# Patient Record
Sex: FEMALE | Race: WHITE | Employment: UNEMPLOYED | URBAN - NONMETROPOLITAN AREA
[De-identification: names, ages, dates, MRNs, and addresses within clinical notes are randomized per-mention and may not be internally consistent; named-entity substitution may affect disease eponyms.]

---

## 2020-04-27 ENCOUNTER — HOSPITAL ENCOUNTER (OUTPATIENT)
Dept: LAB | Age: 35
Discharge: HOME | End: 2020-04-27

## 2020-04-27 ENCOUNTER — HOSPITAL ENCOUNTER (OUTPATIENT)
Age: 35
Setting detail: SPECIMEN
Discharge: HOME | End: 2020-04-27

## 2020-04-27 ENCOUNTER — OFFICE VISIT (OUTPATIENT)
Dept: PRIMARY CARE CLINIC | Age: 35
End: 2020-04-27
Payer: MEDICAID

## 2020-04-27 VITALS
HEIGHT: 63 IN | TEMPERATURE: 100 F | DIASTOLIC BLOOD PRESSURE: 78 MMHG | HEART RATE: 83 BPM | BODY MASS INDEX: 30.76 KG/M2 | OXYGEN SATURATION: 97 % | SYSTOLIC BLOOD PRESSURE: 120 MMHG | WEIGHT: 173.6 LBS

## 2020-04-27 LAB
DIRECT EXAM: ABNORMAL
HAV IGM SER IA-ACNC: NONREACTIVE
HEPATITIS B CORE IGM ANTIBODY: NONREACTIVE
HEPATITIS B SURFACE ANTIGEN: NONREACTIVE
HEPATITIS C ANTIBODY: NONREACTIVE
HIV AG/AB: NONREACTIVE
Lab: ABNORMAL
SPECIMEN DESCRIPTION: ABNORMAL
T. PALLIDUM, IGG: NONREACTIVE

## 2020-04-27 PROCEDURE — 87591 N.GONORRHOEAE DNA AMP PROB: CPT

## 2020-04-27 PROCEDURE — 99212 OFFICE O/P EST SF 10 MIN: CPT | Performed by: FAMILY MEDICINE

## 2020-04-27 PROCEDURE — 87389 HIV-1 AG W/HIV-1&-2 AB AG IA: CPT

## 2020-04-27 PROCEDURE — 80074 ACUTE HEPATITIS PANEL: CPT

## 2020-04-27 PROCEDURE — 87510 GARDNER VAG DNA DIR PROBE: CPT

## 2020-04-27 PROCEDURE — 87491 CHLMYD TRACH DNA AMP PROBE: CPT

## 2020-04-27 PROCEDURE — 86780 TREPONEMA PALLIDUM: CPT

## 2020-04-27 PROCEDURE — 87660 TRICHOMONAS VAGIN DIR PROBE: CPT

## 2020-04-27 PROCEDURE — 87480 CANDIDA DNA DIR PROBE: CPT

## 2020-04-27 PROCEDURE — 36415 COLL VENOUS BLD VENIPUNCTURE: CPT

## 2020-04-27 PROCEDURE — 99214 OFFICE O/P EST MOD 30 MIN: CPT | Performed by: FAMILY MEDICINE

## 2020-04-27 ASSESSMENT — ENCOUNTER SYMPTOMS
DIARRHEA: 0
SINUS PRESSURE: 1
WHEEZING: 0
RHINORRHEA: 1
CONSTIPATION: 0
COUGH: 1
VOMITING: 1
NAUSEA: 1
SORE THROAT: 0
SHORTNESS OF BREATH: 0

## 2020-04-27 NOTE — LETTER
[address] [facility]  [address] [facility] [address]  Phone: [phone]  Fax: [phone],         April 27, 2020     Patient: Judy Catherine   YOB: 1985   Date of Visit: 4/27/2020       To Whom it May Concern:    Judy Catherine was seen in my clinic on 4/27/2020. Please excuse her from work today due to illness. She can return to work when she has remained fever-free without fever-reducing medication for 72 hours in a row. If you have any questions or concerns, please don't hesitate to call.     Sincerely,         Timi Soto, DO

## 2020-04-27 NOTE — PROGRESS NOTES
[address]  [address], [address]lana Moe  [phone]      Suzie Montano is a 38 y.o. female who is c/o of Fever (needs a work note- called off yesterday) and Exposure to STD (new partner, having discharge and oder)      HPI:     Fever    This is a new problem. The current episode started yesterday. The maximum temperature noted was 101 to 101.9 F (101.5 (oral) this am). Associated symptoms include coughing (had one yesterday, but not today), headaches (\"sinus headache\"), nausea (none now) and vomiting (last episode this am - started overnight). Pertinent negatives include no congestion, diarrhea, ear pain, sore throat, urinary pain or wheezing. Treatments tried: Tylenol (last dose 2.5 hours ago), Advil (last dose 2.5 hours ago)   Exposure to STD    The patient's primary symptoms include a discharge. The patient's pertinent negatives include no dysuria. Primary symptoms comment: no known STD exposure, but concerned as she has a new partner; he is not having any current symptoms. This is a new problem. The current episode started 1 to 4 weeks ago (3 weeks ago). The vaginal discharge was thick and milky. Associate symptoms include a fever, a genital odor and urinary frequency. Pertinent negatives include no sore throat. She has tried nothing for the symptoms. No known sick contacts. Subjective:      History reviewed. No pertinent past medical history. History reviewed. No pertinent surgical history. Social History     Tobacco Use    Smoking status: Current Every Day Smoker    Smokeless tobacco: Never Used   Substance Use Topics    Alcohol use: Yes     Alcohol/week: 0.0 standard drinks     Comment: occas      Current Outpatient Medications   Medication Sig Dispense Refill    levothyroxine (SYNTHROID) 175 MCG tablet 150 mcg   1    metroNIDAZOLE (FLAGYL) 500 MG tablet Take 1 tablet by mouth 2 times daily for 7 days Take with food.  [facility] tablet 0     No current facility-administered medications for this visit. No Known Allergies    Review of Systems   Constitutional: Positive for chills, fatigue and fever. HENT: Positive for rhinorrhea (yellow mucus when she would blow her nose) and sinus pressure (frontal). Negative for congestion, ear pain and sore throat. Respiratory: Positive for cough (had one yesterday, but not today). Negative for shortness of breath and wheezing. Gastrointestinal: Positive for nausea (none now) and vomiting (last episode this am - started overnight). Negative for constipation and diarrhea. Genitourinary: Positive for frequency, hematuria (very small spot of blood intermittently when she wipes x past month) and vaginal discharge (creamy, odorous x past 3 weeks - odor is worsening). Negative for dysuria and genital sores. Neurological: Positive for headaches (\"sinus headache\"). Objective:     Vitals:    04/27/20 1322   BP: 120/78   Site: Right Upper Arm   Position: Sitting   Cuff Size: Medium Adult   Pulse: 83   Temp: 100 °F (37.8 °C)   TempSrc: Tympanic   SpO2: 97%   Weight: 173 lb 9.6 oz (78.7 kg)   Height: 5' 3\" (1.6 m)     Physical Exam  Vitals signs and nursing note reviewed. Constitutional:       General: She is not in acute distress. Appearance: She is well-developed. HENT:      Head: Normocephalic and atraumatic. Right Ear: Tympanic membrane, ear canal and external ear normal.      Left Ear: Tympanic membrane, ear canal and external ear normal.      Nose: Mucosal edema present. Right Sinus: Maxillary sinus tenderness and frontal sinus tenderness present. Left Sinus: Maxillary sinus tenderness and frontal sinus tenderness present. Mouth/Throat:      Pharynx: Posterior oropharyngeal erythema (Mild) present. No oropharyngeal exudate. Eyes:      Conjunctiva/sclera: Conjunctivae normal.      Pupils: Pupils are equal, round, and reactive to light.    Neck:

## 2020-04-28 ENCOUNTER — TELEPHONE (OUTPATIENT)
Dept: PRIMARY CARE CLINIC | Age: 35
End: 2020-04-28

## 2020-04-28 LAB
C TRACH DNA GENITAL QL NAA+PROBE: NEGATIVE
N. GONORRHOEAE DNA: NEGATIVE
SPECIMEN DESCRIPTION: NORMAL

## 2020-04-28 RX ORDER — METRONIDAZOLE 500 MG/1
500 TABLET ORAL 2 TIMES DAILY
Qty: 14 TABLET | Refills: 0 | Status: SHIPPED | OUTPATIENT
Start: 2020-04-28 | End: 2020-05-05

## 2020-04-28 NOTE — TELEPHONE ENCOUNTER
PT CALLED IN AND WAS GIVEN THE RESULTS OF LAB RESULTS AND INFORMED OF THE MEDICATION SENT TO THE PHARMACY. PT VOICED UNDERSTANDING.

## 2021-11-06 ENCOUNTER — HOSPITAL ENCOUNTER (OUTPATIENT)
Age: 36
Setting detail: SPECIMEN
Discharge: HOME | End: 2021-11-06
Payer: COMMERCIAL

## 2021-11-06 ENCOUNTER — OFFICE VISIT (OUTPATIENT)
Dept: PRIMARY CARE CLINIC | Age: 36
End: 2021-11-06
Payer: COMMERCIAL

## 2021-11-06 VITALS
TEMPERATURE: 98.4 F | HEART RATE: 99 BPM | OXYGEN SATURATION: 97 % | BODY MASS INDEX: 34.05 KG/M2 | HEIGHT: 63 IN | SYSTOLIC BLOOD PRESSURE: 110 MMHG | DIASTOLIC BLOOD PRESSURE: 80 MMHG | WEIGHT: 192.2 LBS

## 2021-11-06 DIAGNOSIS — J02.9 PHARYNGITIS, UNSPECIFIED ETIOLOGY: ICD-10-CM

## 2021-11-06 DIAGNOSIS — J02.9 PHARYNGITIS, UNSPECIFIED ETIOLOGY: Primary | ICD-10-CM

## 2021-11-06 LAB — S PYO AG THROAT QL: NORMAL

## 2021-11-06 PROCEDURE — U0003 INFECTIOUS AGENT DETECTION BY NUCLEIC ACID (DNA OR RNA); SEVERE ACUTE RESPIRATORY SYNDROME CORONAVIRUS 2 (SARS-COV-2) (CORONAVIRUS DISEASE [COVID-19]), AMPLIFIED PROBE TECHNIQUE, MAKING USE OF HIGH THROUGHPUT TECHNOLOGIES AS DESCRIBED BY CMS-2020-01-R: HCPCS

## 2021-11-06 PROCEDURE — 99212 OFFICE O/P EST SF 10 MIN: CPT

## 2021-11-06 PROCEDURE — 4004F PT TOBACCO SCREEN RCVD TLK: CPT | Performed by: FAMILY MEDICINE

## 2021-11-06 PROCEDURE — PBSHW POCT RAPID STREP A: Performed by: FAMILY MEDICINE

## 2021-11-06 PROCEDURE — G8484 FLU IMMUNIZE NO ADMIN: HCPCS | Performed by: FAMILY MEDICINE

## 2021-11-06 PROCEDURE — G8417 CALC BMI ABV UP PARAM F/U: HCPCS | Performed by: FAMILY MEDICINE

## 2021-11-06 PROCEDURE — 99212 OFFICE O/P EST SF 10 MIN: CPT | Performed by: FAMILY MEDICINE

## 2021-11-06 PROCEDURE — 87880 STREP A ASSAY W/OPTIC: CPT | Performed by: FAMILY MEDICINE

## 2021-11-06 PROCEDURE — U0005 INFEC AGEN DETEC AMPLI PROBE: HCPCS

## 2021-11-06 PROCEDURE — G8427 DOCREV CUR MEDS BY ELIG CLIN: HCPCS | Performed by: FAMILY MEDICINE

## 2021-11-06 PROCEDURE — 99214 OFFICE O/P EST MOD 30 MIN: CPT | Performed by: FAMILY MEDICINE

## 2021-11-06 RX ORDER — PREDNISONE 20 MG/1
TABLET ORAL
Qty: 15 TABLET | Refills: 0 | Status: SHIPPED | OUTPATIENT
Start: 2021-11-06

## 2021-11-06 ASSESSMENT — ENCOUNTER SYMPTOMS
VOMITING: 1
SORE THROAT: 1
COUGH: 1
NAUSEA: 1

## 2021-11-06 ASSESSMENT — PATIENT HEALTH QUESTIONNAIRE - PHQ9
SUM OF ALL RESPONSES TO PHQ9 QUESTIONS 1 & 2: 0
SUM OF ALL RESPONSES TO PHQ QUESTIONS 1-9: 0
2. FEELING DOWN, DEPRESSED OR HOPELESS: 0
SUM OF ALL RESPONSES TO PHQ QUESTIONS 1-9: 0
1. LITTLE INTEREST OR PLEASURE IN DOING THINGS: 0
SUM OF ALL RESPONSES TO PHQ QUESTIONS 1-9: 0

## 2021-11-06 NOTE — PROGRESS NOTES
2021     Radha Sy (:  1985) is a 43 y.o. female, here for evaluation of the following medical concerns:    Pharyngitis  This is a new problem. The current episode started 1 to 4 weeks ago (3 weeks ago has started having symptoms). Associated symptoms include congestion, coughing, fatigue, myalgias, nausea, a sore throat (woke up today and had abrupt onset of sore throat) and vomiting (dry heaving). Pertinent negatives include no fever or joint swelling. Associated symptoms comments: No taste or smell. Has had some wheezing. Hoarseness to voice. She has tried NSAIDs for the symptoms. The treatment provided moderate relief. Did review patient's med list, allergies, social history,pmhx and pshx today as noted in the record. Review of Systems   Constitutional: Positive for fatigue. Negative for fever. HENT: Positive for congestion and sore throat (woke up today and had abrupt onset of sore throat). Respiratory: Positive for cough. Gastrointestinal: Positive for nausea and vomiting (dry heaving). Musculoskeletal: Positive for myalgias. Negative for joint swelling. Prior to Visit Medications    Medication Sig Taking? Authorizing Provider   levothyroxine (SYNTHROID) 175 MCG tablet 150 mcg  Yes Historical Provider, MD        Social History     Tobacco Use    Smoking status: Current Every Day Smoker    Smokeless tobacco: Never Used   Substance Use Topics    Alcohol use: Yes     Alcohol/week: 0.0 standard drinks     Comment: occas        Vitals:    21 1643   BP: 110/80   Site: Left Upper Arm   Position: Sitting   Cuff Size: Medium Adult   Pulse: 99   Temp: 98.4 °F (36.9 °C)   TempSrc: Tympanic   SpO2: 97%   Weight: 192 lb 3.2 oz (87.2 kg)   Height: 5' 3\" (1.6 m)     Estimated body mass index is 34.05 kg/m² as calculated from the following:    Height as of this encounter: 5' 3\" (1.6 m). Weight as of this encounter: 192 lb 3.2 oz (87.2 kg).     Physical Exam  Vitals tests will be prioritized based on responses to questions above. Testing may be delayed due to volume. 2) Print and instruct patient to adhere to CDC home isolation program. (Link Above)              3) Set up or refer patient for a monitoring program.              4) Have patient sign up for and leverage MyChart (if not previously done). Order Specific Question:   Is this test for diagnosis or screening? Answer:   Diagnosis of ill patient     Order Specific Question:   Symptomatic for COVID-19 as defined by CDC? Answer:   Yes     Order Specific Question:   Date of Symptom Onset     Answer:   11/6/2021     Order Specific Question:   Hospitalized for COVID-19? Answer:   No     Order Specific Question:   Admitted to ICU for COVID-19? Answer:   No     Order Specific Question:   Employed in healthcare setting? Answer:   No     Order Specific Question:   Resident in a congregate (group) care setting? Answer:   No     Order Specific Question:   Pregnant? Answer:   No     Order Specific Question:   Previously tested for COVID-19? Answer: Yes    POCT rapid strep A     Rapid strep is negative. Will give prednisone to help with wheezing and inflammation. Can use her albuterol inhaler as needed. Will order covid testing. In interim patient is to quarantine until testing reports are available    Increase fluids and rest    Tylenol 1-2 tabs po q4h prn    Can use mucinex or mucinex DM or comparable for congestion or cough. Return  if no improvement in symptoms or if any further symptoms arise. No follow-ups on file. An electronic signature was used to authenticate this note.     --Adonay Mercer, DO on 11/6/2021 at 4:49 PM

## 2021-11-06 NOTE — PATIENT INSTRUCTIONS
teaspoon).     · You have signs of low blood pressure. These include feeling lightheaded; being too weak to stand; and having cold, pale, clammy skin. Watch closely for changes in your health, and be sure to contact your doctor if:    · Your symptoms get worse.     · You are not getting better as expected.     · You have new or worse symptoms of anxiety, depression, nightmares, or flashbacks. Call before you go to the doctor's office. Follow their instructions. And wear a mask. Current as of: July 1, 2021               Content Version: 13.0  © 2006-2021 [employer]. Care instructions adapted under license by [facility] ([facility]). If you have questions about a medical condition or this instruction, always ask your healthcare professional. [address] any warranty or liability for your use of this information.

## 2021-11-06 NOTE — Clinical Note
[address] [facility] A department of [place] [address]  Phone: [phone]  Fax: [address] [place], DO        November 6, 2021     Patient: Serenity Artis   YOB: 1985   Date of Visit: 11/6/2021       To Whom It May Concern: It is my medical opinion that Serenity Artis {Work release (duty restriction):49038}. If you have any questions or concerns, please don't hesitate to call.     Sincerely,        Estelita Kitchen, DO

## 2021-11-08 LAB
SARS-COV-2: ABNORMAL
SARS-COV-2: ABNORMAL
SOURCE: ABNORMAL

## 2021-11-16 ENCOUNTER — TELEPHONE (OUTPATIENT)
Dept: PRIMARY CARE CLINIC | Age: 36
End: 2021-11-16

## 2021-11-16 NOTE — TELEPHONE ENCOUNTER
Patient would like a refill on her albuterol inhaler. Dr. Linden Augustine  Was told at the time of the visit, that her inhaler had enough. Now inhaler empty and would like a refill. Please contact patient regarding the inhaler.  [facility]  Is her pharmacy

## 2022-03-04 ENCOUNTER — HOSPITAL ENCOUNTER (EMERGENCY)
Age: 37
Discharge: HOME | End: 2022-03-04
Attending: EMERGENCY MEDICINE
Payer: COMMERCIAL

## 2022-03-04 ENCOUNTER — APPOINTMENT (OUTPATIENT)
Dept: CT IMAGING | Age: 37
End: 2022-03-04
Payer: COMMERCIAL

## 2022-03-04 VITALS
BODY MASS INDEX: 28.35 KG/M2 | OXYGEN SATURATION: 96 % | SYSTOLIC BLOOD PRESSURE: 100 MMHG | RESPIRATION RATE: 16 BRPM | HEIGHT: 63 IN | DIASTOLIC BLOOD PRESSURE: 61 MMHG | WEIGHT: 160 LBS | HEART RATE: 55 BPM | TEMPERATURE: 97.6 F

## 2022-03-04 DIAGNOSIS — R10.10 PAIN OF UPPER ABDOMEN: Primary | ICD-10-CM

## 2022-03-04 LAB
-: ABNORMAL
ABSOLUTE EOS #: 0.2 K/UL (ref 0–0.44)
ABSOLUTE IMMATURE GRANULOCYTE: 0.17 K/UL (ref 0–0.3)
ABSOLUTE LYMPH #: 2.88 K/UL (ref 1.1–3.7)
ABSOLUTE MONO #: 0.73 K/UL (ref 0.1–1.2)
ALBUMIN SERPL-MCNC: 4.2 G/DL (ref 3.5–5.2)
ALBUMIN/GLOBULIN RATIO: 1.8 (ref 1–2.5)
ALP BLD-CCNC: 74 U/L (ref 35–104)
ALT SERPL-CCNC: 32 U/L (ref 5–33)
ANION GAP SERPL CALCULATED.3IONS-SCNC: 9 MMOL/L (ref 9–17)
AST SERPL-CCNC: 37 U/L
BACTERIA: ABNORMAL
BASOPHILS # BLD: 1 % (ref 0–2)
BASOPHILS ABSOLUTE: 0.06 K/UL (ref 0–0.2)
BILIRUB SERPL-MCNC: <0.1 MG/DL (ref 0.3–1.2)
BILIRUBIN URINE: NEGATIVE
BUN BLDV-MCNC: 14 MG/DL (ref 6–20)
BUN/CREAT BLD: 16 (ref 9–20)
CALCIUM SERPL-MCNC: 9.2 MG/DL (ref 8.6–10.4)
CHLORIDE BLD-SCNC: 105 MMOL/L (ref 98–107)
CO2: 22 MMOL/L (ref 20–31)
CREAT SERPL-MCNC: 0.87 MG/DL (ref 0.5–0.9)
EOSINOPHILS RELATIVE PERCENT: 2 % (ref 1–4)
EPITHELIAL CELLS UA: >50 /HPF (ref 0–5)
GFR AFRICAN AMERICAN: >60 ML/MIN
GFR NON-AFRICAN AMERICAN: >60 ML/MIN
GFR SERPL CREATININE-BSD FRML MDRD: ABNORMAL ML/MIN/{1.73_M2}
GLUCOSE BLD-MCNC: 128 MG/DL (ref 70–99)
GLUCOSE URINE: NEGATIVE
HCG QUALITATIVE: NEGATIVE
HCT VFR BLD CALC: 41.9 % (ref 36.3–47.1)
HEMOGLOBIN: 14.4 G/DL (ref 11.9–15.1)
IMMATURE GRANULOCYTES: 1 %
KETONES, URINE: NEGATIVE
LEUKOCYTE ESTERASE, URINE: NEGATIVE
LIPASE: 39 U/L (ref 13–60)
LYMPHOCYTES # BLD: 24 % (ref 24–43)
MCH RBC QN AUTO: 32.3 PG (ref 25.2–33.5)
MCHC RBC AUTO-ENTMCNC: 34.4 G/DL (ref 25.2–33.5)
MCV RBC AUTO: 93.9 FL (ref 82.6–102.9)
MONOCYTES # BLD: 6 % (ref 3–12)
MUCUS: ABNORMAL
NITRITE, URINE: NEGATIVE
NRBC AUTOMATED: 0 PER 100 WBC
PDW BLD-RTO: 13.7 % (ref 11.8–14.4)
PH UA: 6 (ref 5–6)
PLATELET # BLD: 404 K/UL (ref 138–453)
PMV BLD AUTO: 9.7 FL (ref 8.1–13.5)
POTASSIUM SERPL-SCNC: 4.2 MMOL/L (ref 3.7–5.3)
PROTEIN UA: NEGATIVE
RBC # BLD: 4.46 M/UL (ref 3.95–5.11)
RBC UA: ABNORMAL /HPF (ref 0–4)
SEG NEUTROPHILS: 66 % (ref 36–65)
SEGMENTED NEUTROPHILS ABSOLUTE COUNT: 7.98 K/UL (ref 1.5–8.1)
SODIUM BLD-SCNC: 136 MMOL/L (ref 135–144)
SPECIFIC GRAVITY UA: 1.03 (ref 1.01–1.02)
TOTAL PROTEIN: 6.5 G/DL (ref 6.4–8.3)
URINE HGB: NEGATIVE
UROBILINOGEN, URINE: NORMAL
WBC # BLD: 12 K/UL (ref 3.5–11.3)
WBC UA: ABNORMAL /HPF (ref 0–4)

## 2022-03-04 PROCEDURE — 83690 ASSAY OF LIPASE: CPT

## 2022-03-04 PROCEDURE — 96374 THER/PROPH/DIAG INJ IV PUSH: CPT

## 2022-03-04 PROCEDURE — 96375 TX/PRO/DX INJ NEW DRUG ADDON: CPT

## 2022-03-04 PROCEDURE — 84703 CHORIONIC GONADOTROPIN ASSAY: CPT

## 2022-03-04 PROCEDURE — 99285 EMERGENCY DEPT VISIT HI MDM: CPT

## 2022-03-04 PROCEDURE — 6360000002 HC RX W HCPCS: Performed by: EMERGENCY MEDICINE

## 2022-03-04 PROCEDURE — 81001 URINALYSIS AUTO W/SCOPE: CPT

## 2022-03-04 PROCEDURE — 80053 COMPREHEN METABOLIC PANEL: CPT

## 2022-03-04 PROCEDURE — 74176 CT ABD & PELVIS W/O CONTRAST: CPT

## 2022-03-04 PROCEDURE — 85025 COMPLETE CBC W/AUTO DIFF WBC: CPT

## 2022-03-04 RX ORDER — ONDANSETRON 2 MG/ML
4 INJECTION INTRAMUSCULAR; INTRAVENOUS ONCE
Status: COMPLETED | OUTPATIENT
Start: 2022-03-04 | End: 2022-03-04

## 2022-03-04 RX ORDER — MORPHINE SULFATE 4 MG/ML
4 INJECTION, SOLUTION INTRAMUSCULAR; INTRAVENOUS ONCE
Status: COMPLETED | OUTPATIENT
Start: 2022-03-04 | End: 2022-03-04

## 2022-03-04 RX ADMIN — ONDANSETRON 4 MG: 2 INJECTION INTRAMUSCULAR; INTRAVENOUS at 01:19

## 2022-03-04 RX ADMIN — MORPHINE SULFATE 4 MG: 4 INJECTION, SOLUTION INTRAMUSCULAR; INTRAVENOUS at 01:18

## 2022-03-04 ASSESSMENT — PAIN DESCRIPTION - LOCATION: LOCATION: ABDOMEN

## 2022-03-04 ASSESSMENT — PAIN SCALES - GENERAL
PAINLEVEL_OUTOF10: 0
PAINLEVEL_OUTOF10: 0
PAINLEVEL_OUTOF10: 6
PAINLEVEL_OUTOF10: 0
PAINLEVEL_OUTOF10: 6

## 2022-03-04 ASSESSMENT — PAIN DESCRIPTION - PAIN TYPE: TYPE: ACUTE PAIN

## 2022-03-04 ASSESSMENT — PAIN DESCRIPTION - FREQUENCY: FREQUENCY: CONTINUOUS

## 2022-03-04 ASSESSMENT — PAIN - FUNCTIONAL ASSESSMENT: PAIN_FUNCTIONAL_ASSESSMENT: 0-10

## 2022-03-04 ASSESSMENT — PAIN DESCRIPTION - DESCRIPTORS: DESCRIPTORS: SHARP

## 2022-03-04 NOTE — ED PROVIDER NOTES
eMERGENCY dEPARTMENT eNCOUnter      Pt Name: Miguelina Mullins  MRN: [id number]  [place] 1985  Date of evaluation: 3/4/2022      CHIEF COMPLAINT       Chief Complaint   Patient presents with    Abdominal Pain         HISTORY OF PRESENT ILLNESS    Miguelina Mullins is a 42 y.o. female who presents abdominal pain. Patient states approximate 1 hour ago she woke up with complaints of abdominal pain, upper quadrant occasionally radiated to her back she has had a couple of prior episodes of similar events recently but none have lasted very long she denies associated chest pain or shortness of breath complains of some nausea but no vomiting no other exacerbating or relieving factors        REVIEW OF SYSTEMS       Review of systems are all reviewed and negative except stated above in HPI    PAST MEDICAL HISTORY    has no past medical history on file. SURGICAL HISTORY      has no past surgical history on file. CURRENT MEDICATIONS       Discharge Medication List as of 3/4/2022  3:01 AM      CONTINUE these medications which have NOT CHANGED    Details   predniSONE (DELTASONE) 20 MG tablet 1 bid for 5 days, 1 qd for 5 days, Disp-15 tablet, R-0Normal      levothyroxine (SYNTHROID) 175 MCG tablet 150 mcg , R-1             ALLERGIES     has No Known Allergies. FAMILY HISTORY     has no family status information on file. family history is not on file. SOCIAL HISTORY      reports that she has been smoking. She has never used smokeless tobacco. She reports current alcohol use. She reports that she does not use drugs. PHYSICAL EXAM     INITIAL VITALS:  height is 5' 3\" (1.6 m) and weight is 160 lb (72.6 kg). Her tympanic temperature is 97.6 °F (36.4 °C). Her blood pressure is 100/61 and her pulse is 55. Her respiration is 16 and oxygen saturation is 96%.       General: Patient is alert nontoxic-appearing female who appears uncomfortable secondary to pain  HEENT: Head is atraumatic mouth shows moist mucous TempSrc:       SpO2: 97% 97% 96% 96%   Weight:       Height:         -------------------------  BP: 100/61, Temp: 97.6 °F (36.4 °C), Pulse: 55, Resp: 16    Orders Placed This Encounter   Medications    ondansetron (ZOFRAN) injection 4 mg    morphine injection 4 mg           Re-evaluation Notes    Patient has slight elevation of white blood cell count otherwise rest her labs are unremarkable she was given morphine and Zofran here with complete relief of her discomfort CT unremarkable at this time I feel she is stable for discharge home with outpatient follow-up and possible evaluation for gallbladder disease return if worse no indication of an atypical cardiac presentation    CRITICAL CARE:   None      CONSULTS:      PROCEDURES:  None    FINAL IMPRESSION      1. Pain of upper abdomen          DISPOSITION/PLAN   DISPOSITION Decision To Discharge 03/04/2022 03:00:20 AM      Condition on Disposition    Stable and improved    PATIENT REFERRED TO:  [address]  [address] [place] [id number] [phone]    In 1 day        DISCHARGE MEDICATIONS:  Discharge Medication List as of 3/4/2022  3:01 AM          (Please note that portions of this note were completed with a voice recognition program.  Efforts were made to edit the dictations but occasionally words are mis-transcribed.)    Elissa Boudreaux MD,, MD, F.A.C.E.P.   Attending Emergency Physician        Elissa Boudreaux MD  03/04/22 [id number]

## 2022-10-11 ENCOUNTER — HOSPITAL ENCOUNTER (OUTPATIENT)
Age: 37
Setting detail: SPECIMEN
Discharge: HOME | End: 2022-10-11
Payer: MEDICAID

## 2022-10-11 ENCOUNTER — OFFICE VISIT (OUTPATIENT)
Dept: PRIMARY CARE CLINIC | Age: 37
End: 2022-10-11
Payer: MEDICAID

## 2022-10-11 VITALS
HEIGHT: 64 IN | DIASTOLIC BLOOD PRESSURE: 80 MMHG | TEMPERATURE: 98.4 F | BODY MASS INDEX: 29.02 KG/M2 | SYSTOLIC BLOOD PRESSURE: 100 MMHG | WEIGHT: 170 LBS | OXYGEN SATURATION: 99 % | HEART RATE: 84 BPM

## 2022-10-11 DIAGNOSIS — R52 BODY ACHES: ICD-10-CM

## 2022-10-11 DIAGNOSIS — J06.9 UPPER RESPIRATORY TRACT INFECTION, UNSPECIFIED TYPE: Primary | ICD-10-CM

## 2022-10-11 LAB
INFLUENZA A ANTIBODY: NORMAL
INFLUENZA B ANTIBODY: NORMAL

## 2022-10-11 PROCEDURE — 99213 OFFICE O/P EST LOW 20 MIN: CPT | Performed by: NURSE PRACTITIONER

## 2022-10-11 PROCEDURE — G8484 FLU IMMUNIZE NO ADMIN: HCPCS | Performed by: NURSE PRACTITIONER

## 2022-10-11 PROCEDURE — G8417 CALC BMI ABV UP PARAM F/U: HCPCS | Performed by: NURSE PRACTITIONER

## 2022-10-11 PROCEDURE — 4004F PT TOBACCO SCREEN RCVD TLK: CPT | Performed by: NURSE PRACTITIONER

## 2022-10-11 PROCEDURE — U0003 INFECTIOUS AGENT DETECTION BY NUCLEIC ACID (DNA OR RNA); SEVERE ACUTE RESPIRATORY SYNDROME CORONAVIRUS 2 (SARS-COV-2) (CORONAVIRUS DISEASE [COVID-19]), AMPLIFIED PROBE TECHNIQUE, MAKING USE OF HIGH THROUGHPUT TECHNOLOGIES AS DESCRIBED BY CMS-2020-01-R: HCPCS

## 2022-10-11 PROCEDURE — PBSHW POCT INFLUENZA A/B: Performed by: NURSE PRACTITIONER

## 2022-10-11 PROCEDURE — G8427 DOCREV CUR MEDS BY ELIG CLIN: HCPCS | Performed by: NURSE PRACTITIONER

## 2022-10-11 PROCEDURE — 99212 OFFICE O/P EST SF 10 MIN: CPT | Performed by: NURSE PRACTITIONER

## 2022-10-11 PROCEDURE — 87804 INFLUENZA ASSAY W/OPTIC: CPT | Performed by: NURSE PRACTITIONER

## 2022-10-11 PROCEDURE — U0005 INFEC AGEN DETEC AMPLI PROBE: HCPCS

## 2022-10-11 ASSESSMENT — ENCOUNTER SYMPTOMS
ABDOMINAL PAIN: 0
SORE THROAT: 1
RHINORRHEA: 1
COUGH: 1
SHORTNESS OF BREATH: 0
VOMITING: 1
WHEEZING: 0
CHEST TIGHTNESS: 0

## 2022-10-11 ASSESSMENT — PATIENT HEALTH QUESTIONNAIRE - PHQ9
SUM OF ALL RESPONSES TO PHQ QUESTIONS 1-9: 0
SUM OF ALL RESPONSES TO PHQ9 QUESTIONS 1 & 2: 0
1. LITTLE INTEREST OR PLEASURE IN DOING THINGS: 0
2. FEELING DOWN, DEPRESSED OR HOPELESS: 0
SUM OF ALL RESPONSES TO PHQ QUESTIONS 1-9: 0

## 2022-10-11 NOTE — PROGRESS NOTES
[facility]             [address], [address]                        Telephone [phone]             Fax [phone]  1985  ZV:[id number]   Date of visit:  10/11/2022    Subjective:    Francisco Anderson is a 42 y.o.  female who presents to [facility] today (10/11/2022) for evaluation of:    Chief Complaint   Patient presents with    Fever     Sx began sat evening, body aches,vomiting,hasn't been eating much. Pt is drinking but pt vomits also. Pt is fatigue. Generalized Body Aches  This is a new problem. The current episode started in the past 7 days (10/08/22). The problem occurs constantly. The problem has been unchanged. Associated symptoms include congestion, coughing, fatigue, a fever (101.0; now resolved), headaches, a sore throat (now resolved) and vomiting (last episode yesterday). Pertinent negatives include no abdominal pain or chest pain. Associated symptoms comments: rhinorrhea. Nothing aggravates the symptoms. She has tried acetaminophen for the symptoms. The treatment provided mild relief. She has the following problem list:  There is no problem list on file for this patient. Current medications are:  Current Outpatient Medications   Medication Sig Dispense Refill    levothyroxine (SYNTHROID) 175 MCG tablet 150 mcg   1    predniSONE (DELTASONE) 20 MG tablet 1 bid for 5 days, 1 qd for 5 days (Patient not taking: Reported on 10/11/2022) 15 tablet 0     No current facility-administered medications for this visit. She has No Known Allergies. .    She  reports that she has been smoking. She has never used smokeless tobacco.      Objective:    Vitals:    10/11/22 1303   BP: 100/80   Pulse: 84   Temp: 98.4 °F (36.9 °C)   TempSrc: Tympanic   SpO2: 99%   Weight: 170 lb (77.1 kg)   Height: 5' 3.5\" (1.613 m)     Body mass index is 29.64 kg/m².     Review of Systems   Constitutional:  Positive for fatigue and fever (101.0; now resolved). HENT:  Positive for congestion, postnasal drip, rhinorrhea and sore throat (now resolved). Respiratory:  Positive for cough. Negative for chest tightness, shortness of breath and wheezing. Cardiovascular: Negative. Negative for chest pain. Gastrointestinal:  Positive for vomiting (last episode yesterday). Negative for abdominal pain. Neurological:  Positive for headaches. Physical Exam  Vitals and nursing note reviewed. Constitutional:       Appearance: Normal appearance. She is well-developed. HENT:      Head: Normocephalic. Jaw: There is normal jaw occlusion. Right Ear: Tympanic membrane, ear canal and external ear normal.      Left Ear: Tympanic membrane, ear canal and external ear normal.      Nose: Congestion and rhinorrhea present. Rhinorrhea is clear. Right Turbinates: Swollen (erythema). Left Turbinates: Swollen (erythema). Right Sinus: No maxillary sinus tenderness or frontal sinus tenderness. Left Sinus: No maxillary sinus tenderness or frontal sinus tenderness. Mouth/Throat:      Lips: Pink. Mouth: Mucous membranes are moist.      Pharynx: Oropharynx is clear. Uvula midline. Eyes:      Pupils: Pupils are equal, round, and reactive to light. Cardiovascular:      Rate and Rhythm: Normal rate and regular rhythm. Heart sounds: Normal heart sounds. Pulmonary:      Effort: Pulmonary effort is normal.      Breath sounds: Normal breath sounds and air entry. Abdominal:      General: Abdomen is flat. Bowel sounds are increased. Palpations: Abdomen is soft. Tenderness: There is no abdominal tenderness. Musculoskeletal:      Cervical back: Normal range of motion and neck supple. Skin:     General: Skin is warm and dry. Neurological:      General: No focal deficit present. Mental Status: She is alert and oriented to person, place, and time. Psychiatric:         Behavior: Behavior normal.         Thought Content: Thought content normal.       Assessment and Plan:    Results for POC orders placed in visit on 10/11/22   POCT Influenza A/B   Result Value Ref Range    Influenza A Ab neg     Influenza B Ab neg         Diagnosis Orders   1. Upper respiratory tract infection, unspecified type        2. Body aches  COVID-19    POCT Influenza A/B        Self quarantine until negative Covid-19 test result received and symptoms improving. We will call with Covid-19 test results. I recommended that she use mucinex to help with congestion and cough. I also recommended Flonase for sinus symptoms. she was also encouraged to use tylenol or ibuprofen for pain/fever. Increase water intake. Use cool mist humidifier at bedtime. Use nasal saline flush as needed. Good hand hygiene. she was instructed to return if there is no improvement or symptoms worsen. The use, risks, benefits, and side effects of prescribed or recommended medications were discussed. All questions were answered and the patient/caregiver voiced understanding. No orders of the defined types were placed in this encounter.         Electronically signed by MILDRED Kothari CNP on 10/11/22 at 1:20 PM EDT

## 2022-10-11 NOTE — LETTER
[address] [facility] department of [facility]  Phone: [phone]  Fax: [phone]        MILDRED Benjamin CNP      October 11, 2022    Patient:   Anjel Soares  Date of Birth   1985  Date of visit   10/11/2022        To Whom it May Concern:      Anjel Soares was seen in my clinic on 10/11/2022. Please excuse from work 10/10/22 thru 10/13/22. May return to work on 10/17/22. If you have any questions or concerns, please don't hesitate to call.       Sincerely,      MILDRED Benjamin CNP

## 2022-10-12 LAB
SARS-COV-2: NORMAL
SARS-COV-2: NOT DETECTED
SOURCE: NORMAL

## 2023-01-27 ENCOUNTER — APPOINTMENT (OUTPATIENT)
Dept: CT IMAGING | Age: 38
End: 2023-01-27
Payer: MEDICAID

## 2023-01-27 ENCOUNTER — HOSPITAL ENCOUNTER (EMERGENCY)
Age: 38
Discharge: HOME | End: 2023-01-27
Attending: EMERGENCY MEDICINE
Payer: MEDICAID

## 2023-01-27 VITALS
RESPIRATION RATE: 16 BRPM | HEART RATE: 60 BPM | OXYGEN SATURATION: 97 % | BODY MASS INDEX: 29.23 KG/M2 | WEIGHT: 165 LBS | HEIGHT: 63 IN | TEMPERATURE: 98 F | SYSTOLIC BLOOD PRESSURE: 100 MMHG | DIASTOLIC BLOOD PRESSURE: 58 MMHG

## 2023-01-27 DIAGNOSIS — R10.9 ABDOMINAL PAIN, UNSPECIFIED ABDOMINAL LOCATION: Primary | ICD-10-CM

## 2023-01-27 DIAGNOSIS — K80.50 BILIARY COLIC: ICD-10-CM

## 2023-01-27 LAB
ABSOLUTE EOS #: 0.1 K/UL (ref 0–0.44)
ABSOLUTE IMMATURE GRANULOCYTE: 0.04 K/UL (ref 0–0.3)
ABSOLUTE LYMPH #: 1.78 K/UL (ref 1.1–3.7)
ABSOLUTE MONO #: 0.69 K/UL (ref 0.1–1.2)
ALBUMIN SERPL-MCNC: 4 G/DL (ref 3.5–5.2)
ALBUMIN/GLOBULIN RATIO: 1.7 (ref 1–2.5)
ALP BLD-CCNC: 64 U/L (ref 35–104)
ALT SERPL-CCNC: 26 U/L (ref 5–33)
AMYLASE: 76 U/L (ref 28–100)
ANION GAP SERPL CALCULATED.3IONS-SCNC: 9 MMOL/L (ref 9–17)
AST SERPL-CCNC: 30 U/L
BACTERIA: NORMAL
BASOPHILS # BLD: 0 % (ref 0–2)
BASOPHILS ABSOLUTE: 0.04 K/UL (ref 0–0.2)
BILIRUB SERPL-MCNC: 0.2 MG/DL (ref 0.3–1.2)
BILIRUBIN DIRECT: 0.1 MG/DL
BILIRUBIN URINE: NEGATIVE
BILIRUBIN, INDIRECT: 0.1 MG/DL (ref 0–1)
BUN BLDV-MCNC: 11 MG/DL (ref 6–20)
BUN/CREAT BLD: 13 (ref 9–20)
CALCIUM SERPL-MCNC: 9.1 MG/DL (ref 8.6–10.4)
CHLORIDE BLD-SCNC: 109 MMOL/L (ref 98–107)
CO2: 23 MMOL/L (ref 20–31)
CREAT SERPL-MCNC: 0.86 MG/DL (ref 0.5–0.9)
EOSINOPHILS RELATIVE PERCENT: 1 % (ref 1–4)
EPITHELIAL CELLS UA: NORMAL /HPF (ref 0–5)
GFR SERPL CREATININE-BSD FRML MDRD: >60 ML/MIN/1.73M2
GLOBULIN: 2.4 G/DL (ref 1.5–3.8)
GLUCOSE BLD-MCNC: 102 MG/DL (ref 70–99)
GLUCOSE URINE: NEGATIVE
HCG QUALITATIVE: NEGATIVE
HCT VFR BLD CALC: 43.4 % (ref 36.3–47.1)
HEMOGLOBIN: 15 G/DL (ref 11.9–15.1)
IMMATURE GRANULOCYTES: 0 %
KETONES, URINE: NEGATIVE
LACTIC ACID, SEPSIS: 1.1 MMOL/L (ref 0.5–1.9)
LEUKOCYTE ESTERASE, URINE: NEGATIVE
LIPASE: 34 U/L (ref 13–60)
LYMPHOCYTES # BLD: 15 % (ref 24–43)
MCH RBC QN AUTO: 31.8 PG (ref 25.2–33.5)
MCHC RBC AUTO-ENTMCNC: 34.6 G/DL (ref 25.2–33.5)
MCV RBC AUTO: 92.1 FL (ref 82.6–102.9)
MONOCYTES # BLD: 6 % (ref 3–12)
NITRITE, URINE: NEGATIVE
NRBC AUTOMATED: 0 PER 100 WBC
PDW BLD-RTO: 13.5 % (ref 11.8–14.4)
PH UA: 6.5 (ref 5–6)
PLATELET # BLD: 356 K/UL (ref 138–453)
PMV BLD AUTO: 9.2 FL (ref 8.1–13.5)
POTASSIUM SERPL-SCNC: 4.1 MMOL/L (ref 3.7–5.3)
PROTEIN UA: NEGATIVE
RBC # BLD: 4.71 M/UL (ref 3.95–5.11)
RBC UA: NORMAL /HPF (ref 0–4)
SEG NEUTROPHILS: 78 % (ref 36–65)
SEGMENTED NEUTROPHILS ABSOLUTE COUNT: 9.04 K/UL (ref 1.5–8.1)
SODIUM BLD-SCNC: 141 MMOL/L (ref 135–144)
SPECIFIC GRAVITY UA: 1.01 (ref 1.01–1.02)
TOTAL PROTEIN: 6.4 G/DL (ref 6.4–8.3)
URINE HGB: NEGATIVE
UROBILINOGEN, URINE: NORMAL
WBC # BLD: 11.7 K/UL (ref 3.5–11.3)
WBC UA: NORMAL /HPF (ref 0–4)

## 2023-01-27 PROCEDURE — 93005 ELECTROCARDIOGRAM TRACING: CPT | Performed by: EMERGENCY MEDICINE

## 2023-01-27 PROCEDURE — 6360000002 HC RX W HCPCS: Performed by: EMERGENCY MEDICINE

## 2023-01-27 PROCEDURE — C9113 INJ PANTOPRAZOLE SODIUM, VIA: HCPCS | Performed by: EMERGENCY MEDICINE

## 2023-01-27 PROCEDURE — 2580000003 HC RX 258: Performed by: EMERGENCY MEDICINE

## 2023-01-27 PROCEDURE — 96375 TX/PRO/DX INJ NEW DRUG ADDON: CPT

## 2023-01-27 PROCEDURE — 80076 HEPATIC FUNCTION PANEL: CPT

## 2023-01-27 PROCEDURE — 74177 CT ABD & PELVIS W/CONTRAST: CPT

## 2023-01-27 PROCEDURE — 85025 COMPLETE CBC W/AUTO DIFF WBC: CPT

## 2023-01-27 PROCEDURE — 83605 ASSAY OF LACTIC ACID: CPT

## 2023-01-27 PROCEDURE — 96374 THER/PROPH/DIAG INJ IV PUSH: CPT

## 2023-01-27 PROCEDURE — 82150 ASSAY OF AMYLASE: CPT

## 2023-01-27 PROCEDURE — 84703 CHORIONIC GONADOTROPIN ASSAY: CPT

## 2023-01-27 PROCEDURE — 80048 BASIC METABOLIC PNL TOTAL CA: CPT

## 2023-01-27 PROCEDURE — 6360000004 HC RX CONTRAST MEDICATION: Performed by: EMERGENCY MEDICINE

## 2023-01-27 PROCEDURE — 99285 EMERGENCY DEPT VISIT HI MDM: CPT

## 2023-01-27 PROCEDURE — 81001 URINALYSIS AUTO W/SCOPE: CPT

## 2023-01-27 PROCEDURE — 83690 ASSAY OF LIPASE: CPT

## 2023-01-27 RX ORDER — PANTOPRAZOLE SODIUM 40 MG/10ML
40 INJECTION, POWDER, LYOPHILIZED, FOR SOLUTION INTRAVENOUS ONCE
Status: COMPLETED | OUTPATIENT
Start: 2023-01-27 | End: 2023-01-27

## 2023-01-27 RX ORDER — 0.9 % SODIUM CHLORIDE 0.9 %
1000 INTRAVENOUS SOLUTION INTRAVENOUS ONCE
Status: COMPLETED | OUTPATIENT
Start: 2023-01-27 | End: 2023-01-27

## 2023-01-27 RX ORDER — MORPHINE SULFATE 4 MG/ML
4 INJECTION, SOLUTION INTRAMUSCULAR; INTRAVENOUS ONCE
Status: COMPLETED | OUTPATIENT
Start: 2023-01-27 | End: 2023-01-27

## 2023-01-27 RX ORDER — ONDANSETRON 4 MG/1
4 TABLET, FILM COATED ORAL EVERY 8 HOURS PRN
Qty: 20 TABLET | Refills: 0 | Status: SHIPPED | OUTPATIENT
Start: 2023-01-27

## 2023-01-27 RX ORDER — ONDANSETRON 2 MG/ML
4 INJECTION INTRAMUSCULAR; INTRAVENOUS ONCE
Status: COMPLETED | OUTPATIENT
Start: 2023-01-27 | End: 2023-01-27

## 2023-01-27 RX ORDER — DICYCLOMINE HYDROCHLORIDE 10 MG/1
10 CAPSULE ORAL 4 TIMES DAILY
Qty: 30 CAPSULE | Refills: 0 | Status: SHIPPED | OUTPATIENT
Start: 2023-01-27

## 2023-01-27 RX ADMIN — MORPHINE SULFATE 4 MG: 4 INJECTION, SOLUTION INTRAMUSCULAR; INTRAVENOUS at 14:45

## 2023-01-27 RX ADMIN — SODIUM CHLORIDE 1000 ML: 9 INJECTION, SOLUTION INTRAVENOUS at 14:49

## 2023-01-27 RX ADMIN — IOPAMIDOL 100 ML: 755 INJECTION, SOLUTION INTRAVENOUS at 15:38

## 2023-01-27 RX ADMIN — PANTOPRAZOLE SODIUM 40 MG: 40 INJECTION, POWDER, FOR SOLUTION INTRAVENOUS at 14:48

## 2023-01-27 RX ADMIN — ONDANSETRON 4 MG: 2 INJECTION INTRAMUSCULAR; INTRAVENOUS at 14:47

## 2023-01-27 ASSESSMENT — PAIN SCALES - GENERAL
PAINLEVEL_OUTOF10: 10
PAINLEVEL_OUTOF10: 10

## 2023-01-27 ASSESSMENT — PAIN DESCRIPTION - LOCATION
LOCATION: ABDOMEN
LOCATION: ABDOMEN

## 2023-01-27 ASSESSMENT — PAIN DESCRIPTION - ORIENTATION: ORIENTATION: MID;UPPER

## 2023-01-27 ASSESSMENT — PAIN - FUNCTIONAL ASSESSMENT: PAIN_FUNCTIONAL_ASSESSMENT: 0-10

## 2023-01-27 ASSESSMENT — LIFESTYLE VARIABLES: HOW OFTEN DO YOU HAVE A DRINK CONTAINING ALCOHOL: NEVER

## 2023-01-27 NOTE — ED PROVIDER NOTES
[facility] ED  [address] [facility]  Phone: [phone]  [facility]      Pt Name: Julianne Perera  MRN: [id number]  [place] 1985  Date of evaluation: 2023    CHIEF COMPLAINT       Chief Complaint   Patient presents with    Abdominal Pain    Nausea       HISTORY OF PRESENT ILLNESS    Julianne Perera is a 42 y.o. female who presents to the emergency department complaining of epigastric abdominal pain with chest tightness that started this morning around 8:00 when she woke up. She felt fine when she went to bed last night. She still has her gallbladder her appendix. She rates her pain 10 out of 10 nothing makes it better or worse. No fevers or chills cough or congestion. She admits to nausea and vomiting with some loose stool that did have a blood streak    REVIEW OF SYSTEMS       Constitutional: No fevers or chills   HEENT: No sore throat, rhinorrhea, or earache   Eyes: No blurry vision or double vision no drainage   Cardiovascular: No chest pain or tachycardia positive chest tightness  Respiratory: No wheezing or shortness of breath no cough   Gastrointestinal: Positive nausea vomiting no positive loose stool no constipation positive abdominal pain  : No hematuria or dysuria   Musculoskeletal: No swelling or pain   Skin: No rash   Neurological: No focal neurologic complaints, paresthesias, weakness, or headache     PAST MEDICAL HISTORY    has a past medical history of Thyroid disease. SURGICAL HISTORY      has a past surgical history that includes  section. CURRENT MEDICATIONS       Discharge Medication List as of 2023  5:11 PM        CONTINUE these medications which have NOT CHANGED    Details   predniSONE (DELTASONE) 20 MG tablet 1 bid for 5 days, 1 qd for 5 days, Disp-15 tablet, R-0Normal      levothyroxine (SYNTHROID) 175 MCG tablet 150 mcg , R-1             ALLERGIES     has No Known Allergies.     FAMILY HISTORY     has no family status information on file. family history is not on file. SOCIAL HISTORY      reports that she has been smoking. She has never used smokeless tobacco. She reports current alcohol use. She reports that she does not use drugs. PHYSICAL EXAM       ED Triage Vitals [01/27/23 1412]   BP Temp Temp Source Heart Rate Resp SpO2 Height Weight   131/86 98 °F (36.7 °C) Tympanic 70 20 99 % 5' 3\" (1.6 m) 165 lb (74.8 kg)     Constitutional: Alert, oriented x3, nontoxic, answering questions appropriately, acting properly for age, appears uncomfortable  HEENT: Extraocular muscles intact,   Neck: Trachea midline   Cardiovascular: Regular rhythm and rate no murmurs   Respiratory: Clear to auscultation bilaterally no wheezes, rhonchi, rales, no respiratory distress no tachypnea no retractions no hypoxia  Gastrointestinal: Soft, mild epigastric tenderness palpation, no right or left lower quadrant tenderness to deep palpation, nondistended, positive bowel sounds. No rebound, rigidity, or guarding. Musculoskeletal: No extremity pain or swelling no calf tenderness or asymmetry  Neurologic: Moving all 4 extremities without difficulty there are no gross focal neurologic deficits   Skin: Warm and dry     DIFFERENTIAL DIAGNOSIS/ MDM:     IV fluids and labs. Zofran and morphine. CT abdomen and pelvis. Differential diagnosis considered: Abodminal pain: Cholecystitis, appendicitis, pancreatitis, urinary tract infection, renal stone, bowel obstruction,perforation, aortic dissection, aortic aneurysm, diverticulitis, gastritis, enteritis, colitis.     Chronic Conditions affecting care (DM,HTN,CA, etc): None    Social Determinants of Health affecting care (unable to care for self, lives alone, unemployed, homeless,etc): None    History source(s) (patient,spouse,parent,family,friend,EMS,etc): Patient    Review of external sources (ECF,Hospital records,EMS report, radiology reports, etc): Hospital records    Tests considered but not ordered: See below    Independent interpretation of tests (eg.  X-ray, CAT scan, Doppler studies, EKG): See below    Discussion of x-ray results with radiology: See below    Consults: See below    Consideration for admission/observation (even if discharged): considered admission, final decision will be based on test results and patient status    Prescription considerations: See below    Sepsis considered: Considered, not likely    Critical Care note written: See below      DIAGNOSTIC RESULTS     EKG: All EKG's are interpreted by the Emergency Department Physician who either signs or Co-signs this chart in the absence of a cardiologist.    1434 sinus rhythm rate 49 bradycardia OR undetectable QRS 66  no ST elevations.     Not indicated unless otherwise documented above    LABS:  Results for orders placed or performed during the hospital encounter of 01/27/23   CBC with Auto Differential   Result Value Ref Range    WBC 11.7 (H) 3.5 - 11.3 k/uL    RBC 4.71 3.95 - 5.11 m/uL    Hemoglobin 15.0 11.9 - 15.1 g/dL    Hematocrit 43.4 36.3 - 47.1 %    MCV 92.1 82.6 - 102.9 fL    MCH 31.8 25.2 - 33.5 pg    MCHC 34.6 (H) 25.2 - 33.5 g/dL    RDW 13.5 11.8 - 14.4 %    Platelets 600 582 - 412 k/uL    MPV 9.2 8.1 - 13.5 fL    NRBC Automated 0.0 0.0 per 100 WBC    Seg Neutrophils 78 (H) 36 - 65 %    Lymphocytes 15 (L) 24 - 43 %    Monocytes 6 3 - 12 %    Eosinophils % 1 1 - 4 %    Basophils 0 0 - 2 %    Immature Granulocytes 0 0 %    Segs Absolute 9.04 (H) 1.50 - 8.10 k/uL    Absolute Lymph # 1.78 1.10 - 3.70 k/uL    Absolute Mono # 0.69 0.10 - 1.20 k/uL    Absolute Eos # 0.10 0.00 - 0.44 k/uL    Basophils Absolute 0.04 0.00 - 0.20 k/uL    Absolute Immature Granulocyte 0.04 0.00 - 0.30 k/uL   Basic Metabolic Panel   Result Value Ref Range    Glucose 102 (H) 70 - 99 mg/dL    BUN 11 6 - 20 mg/dL    Creatinine 0.86 0.50 - 0.90 mg/dL    Est, Glom Filt Rate >60 >60 mL/min/1.73m2    Bun/Cre Ratio 13 9 - 20    Calcium 9.1 8.6 - 10.4 mg/dL    Sodium 141 135 - 144 mmol/L    Potassium 4.1 3.7 - 5.3 mmol/L    Chloride 109 (H) 98 - 107 mmol/L    CO2 23 20 - 31 mmol/L    Anion Gap 9 9 - 17 mmol/L   Amylase   Result Value Ref Range    Amylase 76 28 - 100 U/L   Lipase   Result Value Ref Range    Lipase 34 13 - 60 U/L   Hepatic Function Panel   Result Value Ref Range    Albumin 4.0 3.5 - 5.2 g/dL    Alkaline Phosphatase 64 35 - 104 U/L    ALT 26 5 - 33 U/L    AST 30 <32 U/L    Total Bilirubin 0.2 (L) 0.3 - 1.2 mg/dL    Bilirubin, Direct 0.1 <0.3 mg/dL    Bilirubin, Indirect 0.1 0.0 - 1.0 mg/dL    Total Protein 6.4 6.4 - 8.3 g/dL    Globulin 2.4 1.5 - 3.8 g/dL    Albumin/Globulin Ratio 1.7 1.0 - 2.5   Lactate, Sepsis   Result Value Ref Range    Lactic Acid, Sepsis 1.1 0.5 - 1.9 mmol/L   Urinalysis with Reflex to Culture    Specimen: Urine, clean catch   Result Value Ref Range    Glucose, Ur NEGATIVE NEGATIVE    Bilirubin Urine NEGATIVE NEGATIVE    Ketones, Urine NEGATIVE NEGATIVE    Specific [place], UA 1.010 1.010 - 1.025    Urine Hgb NEGATIVE NEGATIVE    pH, UA 6.5 (H) 5.0 - 6.0    Protein, UA NEGATIVE NEGATIVE    Urobilinogen, Urine Normal Normal    Nitrite, Urine NEGATIVE NEGATIVE    Leukocyte Esterase, Urine NEGATIVE NEGATIVE   HCG Qualitative, Serum   Result Value Ref Range    hCG Qual NEGATIVE NEGATIVE   Microscopic Urinalysis   Result Value Ref Range    WBC, UA 0 TO 4 0 - 4 /HPF    RBC, UA 0 TO 4 0 - 4 /HPF    Epithelial Cells UA 0 TO 4 0 - 5 /HPF    Bacteria, UA None None   EKG 12 Lead   Result Value Ref Range    Ventricular Rate 49 BPM    Atrial Rate 62 BPM    QRS Duration 66 ms    Q-T Interval 442 ms    QTc Calculation (Bazett) 399 ms    R Axis 31 degrees    T Axis 44 degrees       Not indicated unless otherwise documented above    RADIOLOGY:   I reviewed the radiologist interpretations:    CT ABDOMEN PELVIS W IV CONTRAST Additional Contrast? None   Final Result   1.   Locules of gas along the posterior aspect of the ascending colon are questionably extraluminal, although there is no secondary evidence of bowel   perforation including no free fluid, no significant inflammatory stranding,   and no bowel wall thickening. 2.  Mild intrahepatic and extrahepatic biliary ductal dilation without   visualized distal obstructing stone. The gallbladder is also moderately   distended and demonstrates mild wall thickening. No significant   pericholecystic inflammation. Correlate with LFTs. Discussed with Dr. Good Carlisle by Dr. Brook Joseph at 11:16 pm on 1/27/2023             Not indicated unless otherwise documented above    EMERGENCY DEPARTMENT COURSE:     The patient was given the following medications:  Orders Placed This Encounter   Medications    0.9 % sodium chloride bolus    ondansetron (ZOFRAN) injection 4 mg    morphine injection 4 mg    pantoprazole (PROTONIX) injection 40 mg    iopamidol (ISOVUE-370) 76 % injection 100 mL    dicyclomine (BENTYL) 10 MG capsule     Sig: Take 1 capsule by mouth 4 times daily     Dispense:  30 capsule     Refill:  0    ondansetron (ZOFRAN) 4 MG tablet     Sig: Take 1 tablet by mouth every 8 hours as needed for Nausea     Dispense:  20 tablet     Refill:  0        Vitals:   -------------------------  BP (!) 100/58   Pulse 60   Temp 98 °F (36.7 °C) (Tympanic)   Resp 16   Ht 5' 3\" (1.6 m)   Wt 74.8 kg (165 lb)   LMP 01/01/2023 (Approximate)   SpO2 97%   BMI 29.23 kg/m²     4 PM resting comfortably no acute distress pain resolved feeling much better. Conversation with radiologist CAT scan shows possible extraluminal air and also mild intrahepatic and extrahepatic biliary ductal dilatation without stone. Gallbladder moderately distended with mild wall thickening but no inflammation. Discussed with Dr. Gabriel Christopher general surgery who feels that the extraluminal air does not necessarily correct based on her presentation. Her abdomen is soft she has no tenderness currently.   She has been dealing with this pain off and on for about 5 to 6 months. We will place her on Bentyl and Zofran. She understands return immediately for any worsening pain or fevers and she will follow-up otherwise with Dr. Maxwell Funez. No fevers no hypotension or tachycardia. The patient understands that at this time there is no evidence for a more malignant underlying process, but also understands that early in the process of an illness or injury, an emergency department workup can be falsely reassuring. Routine discharge counseling was given, and it is understood that worsening, changing or persistent symptoms should prompt an immediate call or follow up with their primary physician or return to the emergency department. The importance of appropriate follow up was also discussed. I have reviewed the disposition diagnosis. I have answered the questions and given discharge instructions. There was voiced understanding of these instructions and no further questions or complaints. CRITICAL CARE:    None    PROCEDURES:    None      OARRS Report if indicated             FINAL IMPRESSION      1. Abdominal pain, unspecified abdominal location    2.  Biliary colic          DISPOSITION/PLAN   DISPOSITION Decision To Discharge 01/27/2023 05:09:45 PM        CONDITION ON DISPOSITION: STABLE       PATIENT REFERRED TO:  Kisha Purcell MD  [address] [place] [phone]    Schedule an appointment as soon as possible for a visit in 3 days      DISCHARGE MEDICATIONS:  Discharge Medication List as of 1/27/2023  5:11 PM        START taking these medications    Details   dicyclomine (BENTYL) 10 MG capsule Take 1 capsule by mouth 4 times daily, Disp-30 capsule, R-0Normal      ondansetron (ZOFRAN) 4 MG tablet Take 1 tablet by mouth every 8 hours as needed for Nausea, Disp-20 tablet, R-0Normal             (Please note that portions of this note were completed with a voice recognition program.  Efforts were made to edit the dictations but occasionally words are mis-transcribed.)    Tanner Cantu,    Attending Emergency Physician     Tanner Cantu, DO  01/27/23 Kitty Avila, DO  01/29/23 0238

## 2023-01-27 NOTE — DISCHARGE INSTRUCTIONS
Take medications as prescribed  Follow-up with general surgery call for an appointment on Monday  Return immediately if any worsening symptoms including worsening abdominal pain, fevers, persistent vomiting or any other concerns    Tell us how we did visit: [email or web address]   and let us know about your experience

## 2023-01-29 LAB
EKG ATRIAL RATE: 62 BPM
EKG Q-T INTERVAL: 442 MS
EKG QRS DURATION: 66 MS
EKG QTC CALCULATION (BAZETT): 399 MS
EKG R AXIS: 31 DEGREES
EKG T AXIS: 44 DEGREES
EKG VENTRICULAR RATE: 49 BPM

## 2023-05-31 ENCOUNTER — OFFICE VISIT (OUTPATIENT)
Dept: PRIMARY CARE CLINIC | Age: 38
End: 2023-05-31
Payer: COMMERCIAL

## 2023-05-31 VITALS
SYSTOLIC BLOOD PRESSURE: 110 MMHG | TEMPERATURE: 98.1 F | WEIGHT: 180 LBS | HEIGHT: 64 IN | OXYGEN SATURATION: 98 % | DIASTOLIC BLOOD PRESSURE: 80 MMHG | BODY MASS INDEX: 30.73 KG/M2 | RESPIRATION RATE: 16 BRPM | HEART RATE: 68 BPM

## 2023-05-31 DIAGNOSIS — Z48.02 VISIT FOR SUTURE REMOVAL: Primary | ICD-10-CM

## 2023-05-31 PROCEDURE — 99999 PR OFFICE/OUTPT VISIT,PROCEDURE ONLY: CPT | Performed by: NURSE PRACTITIONER

## 2023-05-31 PROCEDURE — 99211 OFF/OP EST MAY X REQ PHY/QHP: CPT | Performed by: NURSE PRACTITIONER

## 2023-05-31 ASSESSMENT — ENCOUNTER SYMPTOMS: RESPIRATORY NEGATIVE: 1

## 2023-05-31 NOTE — PROGRESS NOTES
Height: 5' 3.5\" (1.613 m)     Body mass index is 31.39 kg/m². Review of Systems   Constitutional: Negative. Respiratory: Negative. Cardiovascular: Negative. Skin:  Positive for wound. Physical Exam  Vitals and nursing note reviewed. Constitutional:       Appearance: Normal appearance. She is well-developed. HENT:      Head: Normocephalic. Jaw: There is normal jaw occlusion. Mouth/Throat:      Lips: Pink. Mouth: Mucous membranes are moist.      Pharynx: Oropharynx is clear. Uvula midline. Eyes:      Pupils: Pupils are equal, round, and reactive to light. Cardiovascular:      Rate and Rhythm: Normal rate and regular rhythm. Heart sounds: Normal heart sounds. Pulmonary:      Effort: Pulmonary effort is normal.      Breath sounds: Normal breath sounds and air entry. Musculoskeletal:      Cervical back: Normal range of motion and neck supple. Feet:    Skin:     General: Skin is warm and dry. Neurological:      General: No focal deficit present. Mental Status: She is alert and oriented to person, place, and time. Psychiatric:         Behavior: Behavior normal.         Thought Content: Thought content normal.     Assessment and Plan:    No results found for this visit on 05/31/23. Diagnosis Orders   1. Visit for suture removal          One suture removed without difficulty. Macerated skin noted, laceration is not approximated. I recommended to allow area to dry. We discussed signs of infection. Follow up with PCP as needed. The use, risks, benefits, and side effects of prescribed or recommended medications were discussed. All questions were answered and the patient/caregiver voiced understanding. No orders of the defined types were placed in this encounter.         Electronically signed by MILDRED Fontanez CNP on 5/31/23 at 2:09 PM EDT

## 2023-07-05 ENCOUNTER — APPOINTMENT (OUTPATIENT)
Dept: CT IMAGING | Age: 38
End: 2023-07-05
Payer: COMMERCIAL

## 2023-07-05 ENCOUNTER — HOSPITAL ENCOUNTER (EMERGENCY)
Age: 38
Discharge: ANOTHER HOSPITAL | End: 2023-07-05
Attending: EMERGENCY MEDICINE
Payer: COMMERCIAL

## 2023-07-05 VITALS
HEART RATE: 95 BPM | BODY MASS INDEX: 27.31 KG/M2 | DIASTOLIC BLOOD PRESSURE: 90 MMHG | WEIGHT: 160 LBS | RESPIRATION RATE: 18 BRPM | HEIGHT: 64 IN | TEMPERATURE: 99.2 F | SYSTOLIC BLOOD PRESSURE: 130 MMHG | OXYGEN SATURATION: 96 %

## 2023-07-05 DIAGNOSIS — G89.18 ACUTE POST-OPERATIVE PAIN: Primary | ICD-10-CM

## 2023-07-05 DIAGNOSIS — K66.8 PNEUMOPERITONEUM: ICD-10-CM

## 2023-07-05 DIAGNOSIS — E86.0 SEVERE DEHYDRATION: ICD-10-CM

## 2023-07-05 DIAGNOSIS — N17.9 ACUTE KIDNEY INJURY (NONTRAUMATIC) (HCC): ICD-10-CM

## 2023-07-05 LAB
ABSOLUTE BANDS #: 6.55 K/UL
ALBUMIN SERPL-MCNC: 3.4 G/DL (ref 3.5–5.2)
ALBUMIN/GLOB SERPL: 1.1 {RATIO} (ref 1–2.5)
ALP SERPL-CCNC: 69 U/L (ref 35–104)
ALT SERPL-CCNC: 28 U/L (ref 5–33)
ANION GAP SERPL CALCULATED.3IONS-SCNC: 16 MMOL/L (ref 9–17)
ANION GAP SERPL CALCULATED.3IONS-SCNC: 21 MMOL/L (ref 9–17)
AST SERPL-CCNC: 29 U/L
BACTERIA URNS QL MICRO: ABNORMAL
BANDS: 29 %
BASOPHILS # BLD: 0 K/UL (ref 0–0.2)
BASOPHILS NFR BLD: 0 % (ref 0–1)
BILIRUB SERPL-MCNC: 1 MG/DL (ref 0.3–1.2)
BILIRUB UR QL STRIP: ABNORMAL
BUN SERPL-MCNC: 30 MG/DL (ref 6–20)
BUN SERPL-MCNC: 32 MG/DL (ref 6–20)
BUN/CREAT SERPL: 12 (ref 9–20)
BUN/CREAT SERPL: 13 (ref 9–20)
CALCIUM SERPL-MCNC: 8 MG/DL (ref 8.6–10.4)
CALCIUM SERPL-MCNC: 9.4 MG/DL (ref 8.6–10.4)
CHARACTER UR: ABNORMAL
CHLORIDE SERPL-SCNC: 92 MMOL/L (ref 98–107)
CHLORIDE SERPL-SCNC: 96 MMOL/L (ref 98–107)
CLARITY UR: CLEAR
CO2 SERPL-SCNC: 20 MMOL/L (ref 20–31)
CO2 SERPL-SCNC: 22 MMOL/L (ref 20–31)
COLOR UR: YELLOW
CREAT SERPL-MCNC: 2.23 MG/DL (ref 0.5–0.9)
CREAT SERPL-MCNC: 2.72 MG/DL (ref 0.5–0.9)
EOSINOPHIL # BLD: 0 K/UL (ref 0–0.4)
EOSINOPHILS RELATIVE PERCENT: 0 % (ref 1–7)
EPI CELLS #/AREA URNS HPF: ABNORMAL /HPF (ref 0–5)
ERYTHROCYTE [DISTWIDTH] IN BLOOD BY AUTOMATED COUNT: 12.6 % (ref 11.8–14.4)
GFR SERPL CREATININE-BSD FRML MDRD: 22 ML/MIN/1.73M2
GFR SERPL CREATININE-BSD FRML MDRD: 28 ML/MIN/1.73M2
GLUCOSE SERPL-MCNC: 119 MG/DL (ref 70–99)
GLUCOSE SERPL-MCNC: 162 MG/DL (ref 70–99)
GLUCOSE UR STRIP-MCNC: NEGATIVE MG/DL
HCG SERPL QL: NEGATIVE
HCT VFR BLD AUTO: 48.3 % (ref 36.3–47.1)
HGB BLD-MCNC: 17.3 G/DL (ref 11.9–15.1)
HGB UR QL STRIP.AUTO: ABNORMAL
IMM GRANULOCYTES # BLD AUTO: 0 K/UL (ref 0–0.3)
IMM GRANULOCYTES NFR BLD: 0 %
KETONES UR STRIP-MCNC: ABNORMAL MG/DL
LACTATE BLDV-SCNC: 3.5 MMOL/L (ref 0.5–2.2)
LACTATE BLDV-SCNC: 4.1 MMOL/L (ref 0.5–2.2)
LEUKOCYTE ESTERASE UR QL STRIP: NEGATIVE
LIPASE SERPL-CCNC: 13 U/L (ref 13–60)
LYMPHOCYTES # BLD: 5 % (ref 16–46)
LYMPHOCYTES NFR BLD: 1.13 K/UL (ref 1–4.8)
MCH RBC QN AUTO: 32.1 PG (ref 25.2–33.5)
MCHC RBC AUTO-ENTMCNC: 35.8 G/DL (ref 25.2–33.5)
MCV RBC AUTO: 89.6 FL (ref 82.6–102.9)
MONOCYTES NFR BLD: 1.36 K/UL (ref 0.1–1.2)
MONOCYTES NFR BLD: 6 % (ref 4–11)
MORPHOLOGY: ABNORMAL
NEUTROPHILS NFR BLD: 60 % (ref 43–77)
NEUTS SEG NFR BLD: 13.56 K/UL (ref 1.5–8.1)
NITRITE UR QL STRIP: NEGATIVE
NRBC BLD-RTO: 0 PER 100 WBC
PH UR STRIP: 5.5 [PH] (ref 5–6)
PLATELET # BLD AUTO: 504 K/UL (ref 138–453)
PMV BLD AUTO: 10.1 FL (ref 8.1–13.5)
POTASSIUM SERPL-SCNC: 3.9 MMOL/L (ref 3.7–5.3)
POTASSIUM SERPL-SCNC: 3.9 MMOL/L (ref 3.7–5.3)
PROT SERPL-MCNC: 6.4 G/DL (ref 6.4–8.3)
PROT UR STRIP-MCNC: ABNORMAL MG/DL
RBC # BLD AUTO: 5.39 M/UL (ref 3.95–5.11)
RBC #/AREA URNS HPF: ABNORMAL /HPF (ref 0–4)
SODIUM SERPL-SCNC: 133 MMOL/L (ref 135–144)
SODIUM SERPL-SCNC: 134 MMOL/L (ref 135–144)
SP GR UR STRIP: 1.03 (ref 1.01–1.02)
UROBILINOGEN UR STRIP-ACNC: NORMAL
WBC #/AREA URNS HPF: ABNORMAL /HPF (ref 0–4)
WBC OTHER # BLD: 22.6 K/UL (ref 3.5–11.3)

## 2023-07-05 PROCEDURE — 96361 HYDRATE IV INFUSION ADD-ON: CPT

## 2023-07-05 PROCEDURE — 2500000003 HC RX 250 WO HCPCS: Performed by: EMERGENCY MEDICINE

## 2023-07-05 PROCEDURE — 87040 BLOOD CULTURE FOR BACTERIA: CPT

## 2023-07-05 PROCEDURE — 36415 COLL VENOUS BLD VENIPUNCTURE: CPT

## 2023-07-05 PROCEDURE — A4216 STERILE WATER/SALINE, 10 ML: HCPCS | Performed by: EMERGENCY MEDICINE

## 2023-07-05 PROCEDURE — 80053 COMPREHEN METABOLIC PANEL: CPT

## 2023-07-05 PROCEDURE — 6360000002 HC RX W HCPCS: Performed by: EMERGENCY MEDICINE

## 2023-07-05 PROCEDURE — 96376 TX/PRO/DX INJ SAME DRUG ADON: CPT

## 2023-07-05 PROCEDURE — 81001 URINALYSIS AUTO W/SCOPE: CPT

## 2023-07-05 PROCEDURE — 83605 ASSAY OF LACTIC ACID: CPT

## 2023-07-05 PROCEDURE — 2580000003 HC RX 258: Performed by: EMERGENCY MEDICINE

## 2023-07-05 PROCEDURE — 84703 CHORIONIC GONADOTROPIN ASSAY: CPT

## 2023-07-05 PROCEDURE — 99285 EMERGENCY DEPT VISIT HI MDM: CPT

## 2023-07-05 PROCEDURE — 80048 BASIC METABOLIC PNL TOTAL CA: CPT

## 2023-07-05 PROCEDURE — 96366 THER/PROPH/DIAG IV INF ADDON: CPT

## 2023-07-05 PROCEDURE — 6360000002 HC RX W HCPCS

## 2023-07-05 PROCEDURE — 85027 COMPLETE CBC AUTOMATED: CPT

## 2023-07-05 PROCEDURE — 96375 TX/PRO/DX INJ NEW DRUG ADDON: CPT

## 2023-07-05 PROCEDURE — 96365 THER/PROPH/DIAG IV INF INIT: CPT

## 2023-07-05 PROCEDURE — 83690 ASSAY OF LIPASE: CPT

## 2023-07-05 PROCEDURE — 74176 CT ABD & PELVIS W/O CONTRAST: CPT

## 2023-07-05 RX ORDER — FENTANYL CITRATE 50 UG/ML
50 INJECTION, SOLUTION INTRAMUSCULAR; INTRAVENOUS ONCE
Status: COMPLETED | OUTPATIENT
Start: 2023-07-05 | End: 2023-07-05

## 2023-07-05 RX ORDER — DIPHENHYDRAMINE HYDROCHLORIDE 50 MG/ML
25 INJECTION INTRAMUSCULAR; INTRAVENOUS ONCE
Status: COMPLETED | OUTPATIENT
Start: 2023-07-05 | End: 2023-07-05

## 2023-07-05 RX ORDER — 0.9 % SODIUM CHLORIDE 0.9 %
1000 INTRAVENOUS SOLUTION INTRAVENOUS ONCE
Status: COMPLETED | OUTPATIENT
Start: 2023-07-05 | End: 2023-07-05

## 2023-07-05 RX ORDER — ONDANSETRON 2 MG/ML
4 INJECTION INTRAMUSCULAR; INTRAVENOUS ONCE
Status: COMPLETED | OUTPATIENT
Start: 2023-07-05 | End: 2023-07-05

## 2023-07-05 RX ORDER — METOCLOPRAMIDE HYDROCHLORIDE 5 MG/ML
10 INJECTION INTRAMUSCULAR; INTRAVENOUS ONCE
Status: COMPLETED | OUTPATIENT
Start: 2023-07-05 | End: 2023-07-05

## 2023-07-05 RX ORDER — ONDANSETRON 2 MG/ML
INJECTION INTRAMUSCULAR; INTRAVENOUS
Status: COMPLETED
Start: 2023-07-05 | End: 2023-07-05

## 2023-07-05 RX ORDER — SODIUM CHLORIDE 9 MG/ML
INJECTION, SOLUTION INTRAVENOUS CONTINUOUS
Status: DISCONTINUED | OUTPATIENT
Start: 2023-07-05 | End: 2023-07-05 | Stop reason: HOSPADM

## 2023-07-05 RX ADMIN — SODIUM CHLORIDE: 9 INJECTION, SOLUTION INTRAVENOUS at 05:11

## 2023-07-05 RX ADMIN — PIPERACILLIN AND TAZOBACTAM 3375 MG: 3; .375 INJECTION, POWDER, LYOPHILIZED, FOR SOLUTION INTRAVENOUS at 05:56

## 2023-07-05 RX ADMIN — ONDANSETRON 4 MG: 2 INJECTION INTRAMUSCULAR; INTRAVENOUS at 07:51

## 2023-07-05 RX ADMIN — FENTANYL CITRATE 50 MCG: 50 INJECTION, SOLUTION INTRAMUSCULAR; INTRAVENOUS at 06:26

## 2023-07-05 RX ADMIN — METOCLOPRAMIDE 10 MG: 5 INJECTION, SOLUTION INTRAMUSCULAR; INTRAVENOUS at 03:40

## 2023-07-05 RX ADMIN — DIPHENHYDRAMINE HYDROCHLORIDE 25 MG: 50 INJECTION, SOLUTION INTRAMUSCULAR; INTRAVENOUS at 03:42

## 2023-07-05 RX ADMIN — FENTANYL CITRATE 50 MCG: 50 INJECTION, SOLUTION INTRAMUSCULAR; INTRAVENOUS at 03:44

## 2023-07-05 RX ADMIN — SODIUM CHLORIDE 1000 ML: 9 INJECTION, SOLUTION INTRAVENOUS at 04:40

## 2023-07-05 RX ADMIN — FENTANYL CITRATE 50 MCG: 50 INJECTION, SOLUTION INTRAMUSCULAR; INTRAVENOUS at 11:24

## 2023-07-05 RX ADMIN — FENTANYL CITRATE 50 MCG: 50 INJECTION, SOLUTION INTRAMUSCULAR; INTRAVENOUS at 08:08

## 2023-07-05 RX ADMIN — FAMOTIDINE 20 MG: 10 INJECTION INTRAVENOUS at 03:47

## 2023-07-05 RX ADMIN — SODIUM CHLORIDE 1000 ML: 9 INJECTION, SOLUTION INTRAVENOUS at 03:37

## 2023-07-05 ASSESSMENT — PAIN - FUNCTIONAL ASSESSMENT
PAIN_FUNCTIONAL_ASSESSMENT: 0-10
PAIN_FUNCTIONAL_ASSESSMENT: ACTIVITIES ARE NOT PREVENTED
PAIN_FUNCTIONAL_ASSESSMENT: ACTIVITIES ARE NOT PREVENTED

## 2023-07-05 ASSESSMENT — PAIN SCALES - GENERAL
PAINLEVEL_OUTOF10: 7
PAINLEVEL_OUTOF10: 7
PAINLEVEL_OUTOF10: 9
PAINLEVEL_OUTOF10: 7
PAINLEVEL_OUTOF10: 9
PAINLEVEL_OUTOF10: 7

## 2023-07-05 ASSESSMENT — PAIN DESCRIPTION - ORIENTATION
ORIENTATION: MID
ORIENTATION: MID;LOWER
ORIENTATION: LOWER;MID

## 2023-07-05 ASSESSMENT — ENCOUNTER SYMPTOMS
DIARRHEA: 0
SORE THROAT: 0
SHORTNESS OF BREATH: 0
ABDOMINAL PAIN: 1
VOMITING: 1
NAUSEA: 1

## 2023-07-05 ASSESSMENT — PAIN DESCRIPTION - PAIN TYPE
TYPE: ACUTE PAIN
TYPE: ACUTE PAIN

## 2023-07-05 ASSESSMENT — PAIN DESCRIPTION - DESCRIPTORS
DESCRIPTORS: ACHING;SORE

## 2023-07-05 ASSESSMENT — LIFESTYLE VARIABLES
HOW OFTEN DO YOU HAVE A DRINK CONTAINING ALCOHOL: NEVER
HOW MANY STANDARD DRINKS CONTAINING ALCOHOL DO YOU HAVE ON A TYPICAL DAY: PATIENT DOES NOT DRINK

## 2023-07-05 ASSESSMENT — PAIN DESCRIPTION - LOCATION
LOCATION: ABDOMEN

## 2023-07-05 NOTE — ED PROVIDER NOTES
ADDENDUM:      Care of this patient was assumed from Dr. Kwame Enriquez. The patient was seen for Emesis  . The patient's initial evaluation and plan have been discussed with the prior provider who initially evaluated the patient. Nursing Notes, Past Medical Hx, Past Surgical Hx, Social Hx, Allergies, and Family Hx were all reviewed. Diagnostic Results     EKG: All EKG's are interpreted by the Emergency Department Physician who either signs or Co-signs this chart in the absence of a cardiologist.        RADIOLOGY:All plain film, CT, MRI, and formal ultrasound images (except ED bedside ultrasound) are read by the radiologist and the images and interpretations are reviewed by the emergency physician. Studies:      CT ABDOMEN PELVIS WO CONTRAST Additional Contrast? None   Final Result   1. Postsurgical changes are noted from cholecystectomy as well as presumed   hernia repair along the anterior abdominal wall. There is moderate to large   volume ascites with associated pneumoperitoneum. While this could be related   to recent surgery, underlying bowel perforation cannot entirely be excluded. 2. Small bilateral pleural effusions with associated areas of atelectasis in   the lungs. Results discussed with Dr. Kwame Enriquez at 4:41 a.m. on 07/05/2023.              LABS:   Labs Reviewed   CBC WITH AUTO DIFFERENTIAL - Abnormal; Notable for the following components:       Result Value    WBC 22.6 (*)     RBC 5.39 (*)     Hemoglobin 17.3 (*)     Hematocrit 48.3 (*)     MCHC 35.8 (*)     Platelets 134 (*)     Lymphocytes 5 (*)     Eosinophils % 0 (*)     Bands 29 (*)     Absolute Mono # 1.36 (*)     Segs Absolute 13.56 (*)     All other components within normal limits   COMPREHENSIVE METABOLIC PANEL - Abnormal; Notable for the following components:    Glucose 162 (*)     BUN 32 (*)     Creatinine 2.72 (*)     Est, Glom Filt Rate 22 (*)     Sodium 133 (*)     Chloride 92 (*)     Anion Gap 21 (*)     Albumin 3.4 (*)     All other
90 minutes. I did also call his office, listened to a message that states press 4 for the on call physician, when you press 4 it takes you to a recording of Dr. Tayler Guerrero stating that he is the on call physician and to leave a message if this is urgent and someone will call back. [TS]   [id number] I did update the Patient. At this time, vital signs are stable, she is nontoxic, resting comfortably, no distress, still complaining of some intermittent abdominal pain. [TS]   [id number] Attempted to call patients cell again at 0631 with no answer again [TS]   0707 Spoke with Dr. Tayler Gurerero at 1943, at this time he states he will definitely need a HIDA scan to see if there is a hepatobiliary leak, may need IR as well. We are checking to see if we have those capabilities today. His preference for tertiary care center would be [facility] if we are unable to accommodate this testing here. He states that the patient may need ERCP or an operation depending on the results of the test and [facility] would be his preferred place to do this [TS]   [phone] 12 In speaking with Dr. Tayler Guerrero again, he misunderstood the hospital that I was calling from. He thought that I was at [facility], I explained that this is [facility]. He does not have privileges here and he is requesting we transfer the patient to [facility] [TS]   0715 Patient is amenable to the plan. Will initiate transfer to [facility] for further evaluation and treatment [TS]      ED Course User Index  [TS] Navdeep Purcell,      Critical Care: The high probability of sudden, clinically significant deterioration in the patient's condition required the highest level of my preparedness to intervene urgently. The services I provided to this patient were to treat and/or prevent clinically significant deterioration.  Services included the following: chart data review, reviewing nursing notes and/or old charts, documentation time, consultant collaboration regarding findings and treatment options,

## 2023-07-09 LAB
MICROORGANISM SPEC CULT: NORMAL
MICROORGANISM SPEC CULT: NORMAL
SPECIMEN DESCRIPTION: NORMAL
SPECIMEN DESCRIPTION: NORMAL

## 2023-07-15 ENCOUNTER — HOSPITAL ENCOUNTER (EMERGENCY)
Age: 38
Discharge: ANOTHER HOSPITAL | End: 2023-07-16
Attending: SPECIALIST
Payer: COMMERCIAL

## 2023-07-15 ENCOUNTER — APPOINTMENT (OUTPATIENT)
Dept: CT IMAGING | Age: 38
End: 2023-07-15
Payer: COMMERCIAL

## 2023-07-15 DIAGNOSIS — T81.43XA POSTOPERATIVE INTRA-ABDOMINAL ABSCESS: Primary | ICD-10-CM

## 2023-07-15 LAB
ALBUMIN SERPL-MCNC: 3.1 G/DL (ref 3.5–5.2)
ALBUMIN/GLOB SERPL: 1 {RATIO} (ref 1–2.5)
ALP SERPL-CCNC: 528 U/L (ref 35–104)
ALT SERPL-CCNC: 56 U/L (ref 5–33)
AMORPH SED URNS QL MICRO: ABNORMAL
ANION GAP SERPL CALCULATED.3IONS-SCNC: 12 MMOL/L (ref 9–17)
AST SERPL-CCNC: 39 U/L
BACTERIA URNS QL MICRO: ABNORMAL
BASOPHILS # BLD: 0.07 K/UL (ref 0–0.2)
BASOPHILS NFR BLD: 1 % (ref 0–2)
BILIRUB SERPL-MCNC: 0.2 MG/DL (ref 0.3–1.2)
BILIRUB UR QL STRIP: NEGATIVE
BUN SERPL-MCNC: 10 MG/DL (ref 6–20)
BUN/CREAT SERPL: 11 (ref 9–20)
CALCIUM SERPL-MCNC: 9.1 MG/DL (ref 8.6–10.4)
CHARACTER UR: ABNORMAL
CHLORIDE SERPL-SCNC: 100 MMOL/L (ref 98–107)
CLARITY UR: CLEAR
CO2 SERPL-SCNC: 23 MMOL/L (ref 20–31)
COLOR UR: YELLOW
CREAT SERPL-MCNC: 0.9 MG/DL (ref 0.5–0.9)
EOSINOPHIL # BLD: 0.16 K/UL (ref 0–0.44)
EOSINOPHILS RELATIVE PERCENT: 1 % (ref 1–4)
EPI CELLS #/AREA URNS HPF: ABNORMAL /HPF (ref 0–5)
ERYTHROCYTE [DISTWIDTH] IN BLOOD BY AUTOMATED COUNT: 14.7 % (ref 11.8–14.4)
GFR SERPL CREATININE-BSD FRML MDRD: >60 ML/MIN/1.73M2
GLUCOSE SERPL-MCNC: 103 MG/DL (ref 70–99)
GLUCOSE UR STRIP-MCNC: NEGATIVE MG/DL
HCT VFR BLD AUTO: 35.8 % (ref 36.3–47.1)
HGB BLD-MCNC: 12.1 G/DL (ref 11.9–15.1)
HGB UR QL STRIP.AUTO: ABNORMAL
IMM GRANULOCYTES # BLD AUTO: 0.15 K/UL (ref 0–0.3)
IMM GRANULOCYTES NFR BLD: 1 %
KETONES UR STRIP-MCNC: NEGATIVE MG/DL
LACTATE BLDV-SCNC: 2.2 MMOL/L (ref 0.5–2.2)
LEUKOCYTE ESTERASE UR QL STRIP: NEGATIVE
LIPASE SERPL-CCNC: 107 U/L (ref 13–60)
LYMPHOCYTES # BLD: 17 % (ref 24–43)
LYMPHOCYTES NFR BLD: 2.6 K/UL (ref 1.1–3.7)
MCH RBC QN AUTO: 31.8 PG (ref 25.2–33.5)
MCHC RBC AUTO-ENTMCNC: 33.8 G/DL (ref 25.2–33.5)
MCV RBC AUTO: 94 FL (ref 82.6–102.9)
MONOCYTES NFR BLD: 0.68 K/UL (ref 0.1–1.2)
MONOCYTES NFR BLD: 4 % (ref 3–12)
NEUTROPHILS NFR BLD: 76 % (ref 36–65)
NEUTS SEG NFR BLD: 11.85 K/UL (ref 1.5–8.1)
NITRITE UR QL STRIP: NEGATIVE
NRBC BLD-RTO: 0 PER 100 WBC
PH UR STRIP: 7 [PH] (ref 5–6)
PLATELET # BLD AUTO: 779 K/UL (ref 138–453)
PMV BLD AUTO: 9.7 FL (ref 8.1–13.5)
POTASSIUM SERPL-SCNC: 4.3 MMOL/L (ref 3.7–5.3)
PROT SERPL-MCNC: 6.3 G/DL (ref 6.4–8.3)
PROT UR STRIP-MCNC: NEGATIVE MG/DL
RBC # BLD AUTO: 3.81 M/UL (ref 3.95–5.11)
RBC # BLD: ABNORMAL 10*6/UL
RBC #/AREA URNS HPF: ABNORMAL /HPF (ref 0–4)
SODIUM SERPL-SCNC: 135 MMOL/L (ref 135–144)
SP GR UR STRIP: 1.02 (ref 1.01–1.02)
UROBILINOGEN UR STRIP-ACNC: NORMAL EU/DL (ref 0–1)
WBC #/AREA URNS HPF: ABNORMAL /HPF (ref 0–4)
WBC OTHER # BLD: 15.5 K/UL (ref 3.5–11.3)

## 2023-07-15 PROCEDURE — 6360000004 HC RX CONTRAST MEDICATION: Performed by: SPECIALIST

## 2023-07-15 PROCEDURE — 96375 TX/PRO/DX INJ NEW DRUG ADDON: CPT

## 2023-07-15 PROCEDURE — 87040 BLOOD CULTURE FOR BACTERIA: CPT

## 2023-07-15 PROCEDURE — 2580000003 HC RX 258: Performed by: SPECIALIST

## 2023-07-15 PROCEDURE — 80053 COMPREHEN METABOLIC PANEL: CPT

## 2023-07-15 PROCEDURE — 83605 ASSAY OF LACTIC ACID: CPT

## 2023-07-15 PROCEDURE — 2709999900 CT ABDOMEN PELVIS W IV CONTRAST

## 2023-07-15 PROCEDURE — 85027 COMPLETE CBC AUTOMATED: CPT

## 2023-07-15 PROCEDURE — 36415 COLL VENOUS BLD VENIPUNCTURE: CPT

## 2023-07-15 PROCEDURE — 96365 THER/PROPH/DIAG IV INF INIT: CPT

## 2023-07-15 PROCEDURE — 6360000002 HC RX W HCPCS: Performed by: SPECIALIST

## 2023-07-15 PROCEDURE — 81001 URINALYSIS AUTO W/SCOPE: CPT

## 2023-07-15 PROCEDURE — 99285 EMERGENCY DEPT VISIT HI MDM: CPT

## 2023-07-15 PROCEDURE — 83690 ASSAY OF LIPASE: CPT

## 2023-07-15 RX ORDER — FENTANYL CITRATE 50 UG/ML
50 INJECTION, SOLUTION INTRAMUSCULAR; INTRAVENOUS ONCE
Status: COMPLETED | OUTPATIENT
Start: 2023-07-15 | End: 2023-07-15

## 2023-07-15 RX ORDER — ONDANSETRON 2 MG/ML
4 INJECTION INTRAMUSCULAR; INTRAVENOUS ONCE
Status: COMPLETED | OUTPATIENT
Start: 2023-07-15 | End: 2023-07-15

## 2023-07-15 RX ORDER — 0.9 % SODIUM CHLORIDE 0.9 %
1000 INTRAVENOUS SOLUTION INTRAVENOUS ONCE
Status: COMPLETED | OUTPATIENT
Start: 2023-07-15 | End: 2023-07-15

## 2023-07-15 RX ORDER — SODIUM CHLORIDE 0.9 % (FLUSH) 0.9 %
3 SYRINGE (ML) INJECTION EVERY 8 HOURS
Status: DISCONTINUED | OUTPATIENT
Start: 2023-07-15 | End: 2023-07-16 | Stop reason: HOSPADM

## 2023-07-15 RX ORDER — 0.9 % SODIUM CHLORIDE 0.9 %
1000 INTRAVENOUS SOLUTION INTRAVENOUS ONCE
Status: COMPLETED | OUTPATIENT
Start: 2023-07-15 | End: 2023-07-16

## 2023-07-15 RX ADMIN — FENTANYL CITRATE 50 MCG: 50 INJECTION, SOLUTION INTRAMUSCULAR; INTRAVENOUS at 23:18

## 2023-07-15 RX ADMIN — HYDROMORPHONE HYDROCHLORIDE 1 MG: 1 INJECTION, SOLUTION INTRAMUSCULAR; INTRAVENOUS; SUBCUTANEOUS at 20:38

## 2023-07-15 RX ADMIN — SODIUM CHLORIDE 1000 ML: 9 INJECTION, SOLUTION INTRAVENOUS at 20:39

## 2023-07-15 RX ADMIN — ONDANSETRON 4 MG: 2 INJECTION INTRAMUSCULAR; INTRAVENOUS at 20:35

## 2023-07-15 RX ADMIN — PIPERACILLIN AND TAZOBACTAM 3375 MG: 3; .375 INJECTION, POWDER, LYOPHILIZED, FOR SOLUTION INTRAVENOUS at 23:23

## 2023-07-15 RX ADMIN — IOPAMIDOL 100 ML: 755 INJECTION, SOLUTION INTRAVENOUS at 22:04

## 2023-07-15 RX ADMIN — SODIUM CHLORIDE 1000 ML: 9 INJECTION, SOLUTION INTRAVENOUS at 23:14

## 2023-07-15 ASSESSMENT — PAIN - FUNCTIONAL ASSESSMENT: PAIN_FUNCTIONAL_ASSESSMENT: 0-10

## 2023-07-15 ASSESSMENT — PAIN SCALES - GENERAL
PAINLEVEL_OUTOF10: 10
PAINLEVEL_OUTOF10: 9
PAINLEVEL_OUTOF10: 10

## 2023-07-15 ASSESSMENT — PAIN DESCRIPTION - DESCRIPTORS
DESCRIPTORS: BURNING
DESCRIPTORS: BURNING

## 2023-07-15 ASSESSMENT — PAIN DESCRIPTION - LOCATION
LOCATION: ABDOMEN
LOCATION: ABDOMEN

## 2023-07-16 VITALS
RESPIRATION RATE: 20 BRPM | DIASTOLIC BLOOD PRESSURE: 74 MMHG | WEIGHT: 170 LBS | OXYGEN SATURATION: 96 % | HEART RATE: 112 BPM | TEMPERATURE: 97.5 F | SYSTOLIC BLOOD PRESSURE: 109 MMHG | BODY MASS INDEX: 30.12 KG/M2 | HEIGHT: 63 IN

## 2023-07-16 ASSESSMENT — PAIN DESCRIPTION - LOCATION
LOCATION: ABDOMEN
LOCATION: ABDOMEN

## 2023-07-16 ASSESSMENT — ENCOUNTER SYMPTOMS
COUGH: 0
ABDOMINAL PAIN: 1
ABDOMINAL DISTENTION: 1
NAUSEA: 1
BACK PAIN: 0
SHORTNESS OF BREATH: 0
SORE THROAT: 0

## 2023-07-16 ASSESSMENT — PAIN - FUNCTIONAL ASSESSMENT: PAIN_FUNCTIONAL_ASSESSMENT: 0-10

## 2023-07-16 ASSESSMENT — PAIN SCALES - GENERAL
PAINLEVEL_OUTOF10: 8
PAINLEVEL_OUTOF10: 8

## 2023-07-20 LAB
MICROORGANISM SPEC CULT: NORMAL
MICROORGANISM SPEC CULT: NORMAL
SERVICE CMNT-IMP: NORMAL
SERVICE CMNT-IMP: NORMAL
SPECIMEN DESCRIPTION: NORMAL
SPECIMEN DESCRIPTION: NORMAL

## 2024-04-28 ENCOUNTER — OFFICE VISIT (OUTPATIENT)
Dept: PRIMARY CARE CLINIC | Age: 39
End: 2024-04-28

## 2024-04-28 VITALS
OXYGEN SATURATION: 98 % | TEMPERATURE: 98 F | SYSTOLIC BLOOD PRESSURE: 118 MMHG | DIASTOLIC BLOOD PRESSURE: 74 MMHG | HEART RATE: 84 BPM | BODY MASS INDEX: 31.53 KG/M2 | WEIGHT: 178 LBS

## 2024-04-28 DIAGNOSIS — L23.7 ALLERGIC CONTACT DERMATITIS DUE TO PLANTS, EXCEPT FOOD: Primary | ICD-10-CM

## 2024-04-28 RX ORDER — TRIAMCINOLONE ACETONIDE 40 MG/ML
40 INJECTION, SUSPENSION INTRA-ARTICULAR; INTRAMUSCULAR ONCE
Status: COMPLETED | OUTPATIENT
Start: 2024-04-28 | End: 2024-04-28

## 2024-04-28 RX ORDER — CLOBETASOL PROPIONATE 0.5 MG/G
OINTMENT TOPICAL
Qty: 45 G | Refills: 0 | Status: SHIPPED | OUTPATIENT
Start: 2024-04-28

## 2024-04-28 RX ADMIN — TRIAMCINOLONE ACETONIDE 40 MG: 40 INJECTION, SUSPENSION INTRA-ARTICULAR; INTRAMUSCULAR at 14:14

## 2024-04-28 ASSESSMENT — PATIENT HEALTH QUESTIONNAIRE - PHQ9
SUM OF ALL RESPONSES TO PHQ9 QUESTIONS 1 & 2: 0
SUM OF ALL RESPONSES TO PHQ QUESTIONS 1-9: 0
2. FEELING DOWN, DEPRESSED OR HOPELESS: NOT AT ALL
1. LITTLE INTEREST OR PLEASURE IN DOING THINGS: NOT AT ALL
SUM OF ALL RESPONSES TO PHQ QUESTIONS 1-9: 0

## 2024-05-11 ENCOUNTER — OFFICE VISIT (OUTPATIENT)
Dept: PRIMARY CARE CLINIC | Age: 39
End: 2024-05-11
Payer: COMMERCIAL

## 2024-05-11 ENCOUNTER — HOSPITAL ENCOUNTER (OUTPATIENT)
Age: 39
Setting detail: SPECIMEN
Discharge: HOME | End: 2024-05-11
Payer: COMMERCIAL

## 2024-05-11 VITALS
SYSTOLIC BLOOD PRESSURE: 118 MMHG | TEMPERATURE: 97 F | BODY MASS INDEX: 30.81 KG/M2 | DIASTOLIC BLOOD PRESSURE: 74 MMHG | OXYGEN SATURATION: 98 % | HEIGHT: 64 IN | WEIGHT: 180.5 LBS | RESPIRATION RATE: 15 BRPM | HEART RATE: 107 BPM

## 2024-05-11 DIAGNOSIS — N89.8 VAGINAL ITCHING: Primary | ICD-10-CM

## 2024-05-11 DIAGNOSIS — N89.8 VAGINAL ITCHING: ICD-10-CM

## 2024-05-11 LAB
CANDIDA SPECIES: NEGATIVE
GARDNERELLA VAGINALIS: POSITIVE
SOURCE: ABNORMAL
TRICHOMONAS: NEGATIVE

## 2024-05-11 PROCEDURE — 99213 OFFICE O/P EST LOW 20 MIN: CPT | Performed by: STUDENT IN AN ORGANIZED HEALTH CARE EDUCATION/TRAINING PROGRAM

## 2024-05-11 PROCEDURE — 87660 TRICHOMONAS VAGIN DIR PROBE: CPT

## 2024-05-11 PROCEDURE — 87480 CANDIDA DNA DIR PROBE: CPT

## 2024-05-11 PROCEDURE — 87510 GARDNER VAG DNA DIR PROBE: CPT

## 2024-05-11 RX ORDER — ALBUTEROL SULFATE 90 UG/1
AEROSOL, METERED RESPIRATORY (INHALATION)
COMMUNITY
Start: 2023-06-15

## 2024-05-11 RX ORDER — CYCLOBENZAPRINE HCL 10 MG
5 TABLET ORAL
COMMUNITY
Start: 2023-07-16

## 2024-05-11 RX ORDER — FLUCONAZOLE 150 MG/1
150 TABLET ORAL
Qty: 2 TABLET | Refills: 0 | Status: SHIPPED | OUTPATIENT
Start: 2024-05-11 | End: 2024-05-17

## 2024-05-11 NOTE — PROGRESS NOTES
[facility]             [address] [address]                        Telephone [phone]             Fax [phone]       Sara Gunn  :  1985  Age:  39 y.o.   MRN:  [id number]  Date of visit:  2024     Assessment and Plan:    1. Vaginal itching  Suspect likely yeast infection secondary to recent steroid use however will also check vaginitis panel at this time for further evaluation.  Recommend return to the urgent care if symptoms worsen or fail to improve.  Trial of Diflucan sent to the pharmacy at this time.  - fluconazole (DIFLUCAN) 150 MG tablet; Take 1 tablet by mouth every 72 hours for 6 days  Dispense: 2 tablet; Refill: 0  - Vaginitis DNA Probe; Future      Subjective:    Sara Gunn is a 39 y.o. female who presents to [facility] today (2024) for evaluation of:  Vaginal Itching (Vaginal itching 4-5 days ago. She itches more at night, she does have hx of BV and yeast infection )    Patient is a 39-year-old female presents to urgent care today for evaluation of vaginal itching ongoing for the past 4 to 5 days.  Has been gradually worsening.  States that it is worse at night.  She does have a history of BV and yeast infections and does feel like this has been similar to both of these.  Has tried multiple at home remedies without major improvement.  Chief Complaint   Patient presents with    Vaginal Itching     Vaginal itching 4-5 days ago. She itches more at night, she does have hx of BV and yeast infection      She has the following problem list:  There is no problem list on file for this patient.       Review of Systems   Genitourinary:         Vaginal itching        Current medications are:  Current Outpatient Medications   Medication Sig Dispense Refill    albuterol sulfate HFA (PROVENTIL;VENTOLIN;PROAIR) 108 (90 Base) MCG/ACT inhaler Albuterol Sulfate Active 2 PUFF INH Q4H

## 2025-07-07 ENCOUNTER — OFFICE VISIT (OUTPATIENT)
Dept: PRIMARY CARE CLINIC | Age: 40
End: 2025-07-07
Payer: COMMERCIAL

## 2025-07-07 VITALS
HEART RATE: 111 BPM | SYSTOLIC BLOOD PRESSURE: 132 MMHG | RESPIRATION RATE: 20 BRPM | OXYGEN SATURATION: 100 % | DIASTOLIC BLOOD PRESSURE: 66 MMHG | BODY MASS INDEX: 31.56 KG/M2 | WEIGHT: 181 LBS

## 2025-07-07 DIAGNOSIS — L23.7 POISON IVY: Primary | ICD-10-CM

## 2025-07-07 PROCEDURE — PBSHW PBB SHADOW CHARGE: Performed by: FAMILY MEDICINE

## 2025-07-07 PROCEDURE — 99213 OFFICE O/P EST LOW 20 MIN: CPT | Performed by: FAMILY MEDICINE

## 2025-07-07 PROCEDURE — 96372 THER/PROPH/DIAG INJ SC/IM: CPT | Performed by: FAMILY MEDICINE

## 2025-07-07 RX ORDER — TRIAMCINOLONE ACETONIDE 1 MG/G
CREAM TOPICAL
Qty: 80 G | Refills: 2 | Status: SHIPPED | OUTPATIENT
Start: 2025-07-07

## 2025-07-07 RX ORDER — BETAMETHASONE SODIUM PHOSPHATE AND BETAMETHASONE ACETATE 3; 3 MG/ML; MG/ML
6 INJECTION, SUSPENSION INTRA-ARTICULAR; INTRALESIONAL; INTRAMUSCULAR; SOFT TISSUE ONCE
Status: COMPLETED | OUTPATIENT
Start: 2025-07-07 | End: 2025-07-07

## 2025-07-07 RX ORDER — PREDNISONE 20 MG/1
TABLET ORAL
Qty: 15 TABLET | Refills: 0 | Status: SHIPPED | OUTPATIENT
Start: 2025-07-07

## 2025-07-07 RX ADMIN — BETAMETHASONE SODIUM PHOSPHATE AND BETAMETHASONE ACETATE 6 MG: 3; 3 INJECTION, SUSPENSION INTRA-ARTICULAR; INTRALESIONAL; INTRAMUSCULAR at 17:37

## 2025-07-07 ASSESSMENT — ENCOUNTER SYMPTOMS
DIARRHEA: 0
ABDOMINAL PAIN: 0
SORE THROAT: 0
SHORTNESS OF BREATH: 0
COUGH: 0
WHEEZING: 0
NAUSEA: 0
COLOR CHANGE: 1

## 2025-07-07 NOTE — PROGRESS NOTES
worsen or fail to improve.    Orders Placed This Encounter   Medications    predniSONE (DELTASONE) 20 MG tablet     Si bid for 5 days, 1 qd for 5 days     Dispense:  15 tablet     Refill:  0    betamethasone acetate-betamethasone sodium phosphate (CELESTONE) injection 6 mg    triamcinolone (KENALOG) 0.1 % cream     Sig: Apply topically 2 times daily.     Dispense:  80 g     Refill:  2       Patientgiven educational materials - see patient instructions.  Discussed use, benefit,and side effects of prescribed medications.  All patient questions answered. Ptvoiced understanding. Reviewed health maintenance.  Instructed to continue currentmedications, diet and exercise.  Patient agreed with treatment plan. Follow up asdirected.     Electronically signed by Kylee Shane MD on 2025 at 8:22 PM